# Patient Record
Sex: MALE | Race: WHITE | NOT HISPANIC OR LATINO | Employment: FULL TIME | URBAN - METROPOLITAN AREA
[De-identification: names, ages, dates, MRNs, and addresses within clinical notes are randomized per-mention and may not be internally consistent; named-entity substitution may affect disease eponyms.]

---

## 2017-11-13 ENCOUNTER — OFFICE VISIT (OUTPATIENT)
Dept: FAMILY MEDICINE | Facility: CLINIC | Age: 32
End: 2017-11-13
Payer: COMMERCIAL

## 2017-11-13 VITALS
BODY MASS INDEX: 29.7 KG/M2 | TEMPERATURE: 99 F | OXYGEN SATURATION: 98 % | SYSTOLIC BLOOD PRESSURE: 116 MMHG | HEIGHT: 65 IN | HEART RATE: 98 BPM | WEIGHT: 178.25 LBS | DIASTOLIC BLOOD PRESSURE: 71 MMHG

## 2017-11-13 DIAGNOSIS — J32.4 PANSINUSITIS, UNSPECIFIED CHRONICITY: ICD-10-CM

## 2017-11-13 DIAGNOSIS — J02.9 PHARYNGITIS, UNSPECIFIED ETIOLOGY: Primary | ICD-10-CM

## 2017-11-13 LAB
CTP QC/QA: YES
S PYO RRNA THROAT QL PROBE: NEGATIVE

## 2017-11-13 PROCEDURE — 99204 OFFICE O/P NEW MOD 45 MIN: CPT | Mod: 25,S$GLB,, | Performed by: FAMILY MEDICINE

## 2017-11-13 PROCEDURE — 99999 PR PBB SHADOW E&M-NEW PATIENT-LVL III: CPT | Mod: PBBFAC,,, | Performed by: FAMILY MEDICINE

## 2017-11-13 PROCEDURE — 87880 STREP A ASSAY W/OPTIC: CPT | Mod: QW,S$GLB,, | Performed by: FAMILY MEDICINE

## 2017-11-13 RX ORDER — AMOXICILLIN AND CLAVULANATE POTASSIUM 875; 125 MG/1; MG/1
1 TABLET, FILM COATED ORAL 2 TIMES DAILY
Qty: 20 TABLET | Refills: 0 | Status: SHIPPED | OUTPATIENT
Start: 2017-11-13 | End: 2017-11-23

## 2017-11-13 RX ORDER — PROMETHAZINE HYDROCHLORIDE AND CODEINE PHOSPHATE 6.25; 1 MG/5ML; MG/5ML
5 SOLUTION ORAL EVERY 6 HOURS PRN
Qty: 118 ML | Refills: 0 | Status: SHIPPED | OUTPATIENT
Start: 2017-11-13 | End: 2017-11-23

## 2017-11-13 NOTE — PATIENT INSTRUCTIONS
Sinusitis (Antibiotic Treatment)    The sinuses are air-filled spaces within the bones of the face. They connect to the inside of the nose. Sinusitis is an inflammation of the tissue lining the sinus cavity. Sinus inflammation can occur during a cold. It can also be due to allergies to pollens and other particles in the air. Sinusitis can cause symptoms of sinus congestion and fullness. A sinus infection causes fever, headache and facial pain. There is often green or yellow drainage from the nose or into the back of the throat (post-nasal drip). You have been given antibiotics to treat this condition.  Home care:  · Take the full course of antibiotics as instructed. Do not stop taking them, even if you feel better.  · Drink plenty of water, hot tea, and other liquids. This may help thin mucus. It also may promote sinus drainage.  · Heat may help soothe painful areas of the face. Use a towel soaked in hot water. Or,  the shower and direct the hot spray onto your face. Using a vaporizer along with a menthol rub at night may also help.   · An expectorant containing guaifenesin may help thin the mucus and promote drainage from the sinuses.  · Over-the-counter decongestants may be used unless a similar medicine was prescribed. Nasal sprays work the fastest. Use one that contains phenylephrine or oxymetazoline. First blow the nose gently. Then use the spray. Do not use these medicines more often than directed on the label or symptoms may get worse. You may also use tablets containing pseudoephedrine. Avoid products that combine ingredients, because side effects may be increased. Read labels. You can also ask the pharmacist for help. (NOTE: Persons with high blood pressure should not use decongestants. They can raise blood pressure.)  · Over-the-counter antihistamines may help if allergies contributed to your sinusitis.    · Do not use nasal rinses or irrigation during an acute sinus infection, unless told to by  your health care provider. Rinsing may spread the infection to other sinuses.  · Use acetaminophen or ibuprofen to control pain, unless another pain medicine was prescribed. (If you have chronic liver or kidney disease or ever had a stomach ulcer, talk with your doctor before using these medicines. Aspirin should never be used in anyone under 18 years of age who is ill with a fever. It may cause severe liver damage.)  · Don't smoke. This can worsen symptoms.  Follow-up care  Follow up with your healthcare provider or our staff if you are not improving within the next week.  When to seek medical advice  Call your healthcare provider if any of these occur:  · Facial pain or headache becoming more severe  · Stiff neck  · Unusual drowsiness or confusion  · Swelling of the forehead or eyelids  · Vision problems, including blurred or double vision  · Fever of 100.4ºF (38ºC) or higher, or as directed by your healthcare provider  · Seizure  · Breathing problems  · Symptoms not resolving within 10 days  Date Last Reviewed: 4/13/2015  © 2433-9456 [employer]. [address] [place] [address]. All rights reserved. This information is not intended as a substitute for professional medical care. Always follow your healthcare professional's instructions.        Self-Care for Sore Throats    Sore throats happen for many reasons, such as colds, allergies, and infections caused by viruses or bacteria. In any case, your throat becomes red and sore. Your goal for self-care is to reduce your discomfort while giving your throat a chance to heal.  Moisten and soothe your throat  Tips include the following:  · Try a sip of water first thing after waking up.  · Keep your throat moist by drinking 6 or more glasses of clear liquids every day.  · Run a cool-air humidifier in your room overnight.  · Avoid cigarette smoke.   · Suck on throat lozenges, cough drops, hard candy, ice chips, or frozen fruit-juice bars. Use the  sugar-free versions if your diet or medical condition requires them.  Gargle to ease irritation  Gargling every hour or 2 can ease irritation. Try gargling with 1 of these solutions:  · 1/4 teaspoon of salt in 1/2 cup of warm water  · An over-the-counter anesthetic gargle  Use medicine for more relief  Over-the-counter medicine can reduce sore throat symptoms. Ask your pharmacist if you have questions about which medicine to use:  · Ease pain with anesthetic sprays. Aspirin or an aspirin substitute also helps. Remember, never give aspirin to anyone 18 or younger, or if you are already taking blood thinners.   · For sore throats caused by allergies, try antihistamines to block the allergic reaction.  · Remember: unless a sore throat is caused by a bacterial infection, antibiotics wont help you.  Prevent future sore throats  Prevention tips include the following:  · Stop smoking or reduce contact with secondhand smoke. Smoke irritates the tender throat lining.  · Limit contact with pets and with allergy-causing substances, such as pollen and mold.  · When youre around someone with a sore throat or cold, wash your hands often to keep viruses or bacteria from spreading.  · Dont strain your vocal cords.  Call your healthcare provider  Contact your healthcare provider if you have:  · A temperature over 101°F (38.3°C)  · White spots on the throat  · Great difficulty swallowing  · Trouble breathing  · A skin rash  · Recent exposure to someone else with strep bacteria  · Severe hoarseness and swollen glands in the neck or jaw   Date Last Reviewed: 8/1/2016  © 6078-0115 [employer]. [address] [place] [address], [place] [address]. All rights reserved. This information is not intended as a substitute for professional medical care. Always follow your healthcare professional's instructions.

## 2017-11-13 NOTE — PROGRESS NOTES
Subjective:       Patient ID: Cirilo Pichardo is a 31 y.o. male.    Chief Complaint: Cough and Sore Throat    Cough   Associated symptoms include headaches, postnasal drip, rhinorrhea and a sore throat. Pertinent negatives include no chest pain, chills, ear pain, fever, rash, shortness of breath or wheezing.   Sore Throat    This is a new problem. The current episode started more than 1 month ago. Associated symptoms include congestion, coughing, diarrhea and headaches. Pertinent negatives include no abdominal pain, drooling, ear discharge, ear pain, plugged ear sensation, neck pain, shortness of breath or swollen glands.   URI    This is a recurrent problem. The current episode started in the past 7 days (has been sick 3 times in the past 2 months. Was treated with steroid both times, got better and then worse. Has been taking keflex at home for the infection for 3 days without any improvement). The problem has been gradually worsening. There has been no fever. Associated symptoms include congestion, coughing, diarrhea, headaches, rhinorrhea, sinus pain, sneezing and a sore throat. Pertinent negatives include no abdominal pain, chest pain, dysuria, ear pain, neck pain, plugged ear sensation, rash, swollen glands or wheezing. Associated symptoms comments: 3 watery stool. Back and shoulder pain. Treatments tried: cold medicine. The treatment provided no relief.   Sinusitis   This is a recurrent problem. The current episode started more than 1 month ago. Associated symptoms include congestion, coughing, headaches, sinus pressure, sneezing and a sore throat. Pertinent negatives include no chills, diaphoresis, ear pain, neck pain, shortness of breath or swollen glands.     Review of Systems   Constitutional: Positive for activity change. Negative for appetite change, chills, diaphoresis, fatigue, fever and unexpected weight change.   HENT: Positive for congestion, postnasal drip, rhinorrhea, sinus pain, sinus pressure,  "sneezing, sore throat and voice change. Negative for drooling, ear discharge, ear pain, facial swelling and tinnitus.    Eyes: Negative for discharge and itching.   Respiratory: Positive for cough. Negative for chest tightness, shortness of breath and wheezing.    Cardiovascular: Negative for chest pain, palpitations and leg swelling.   Gastrointestinal: Positive for diarrhea. Negative for abdominal pain.   Genitourinary: Negative for dysuria.   Musculoskeletal: Positive for back pain. Negative for neck pain.   Skin: Negative for rash.   Neurological: Positive for headaches.   Psychiatric/Behavioral: Positive for agitation.       Objective:      /71 (BP Location: Left arm, Patient Position: Sitting, BP Method: Medium (Manual))   Pulse 98   Temp 98.6 °F (37 °C) (Oral)   Ht 5' 5" (1.651 m)   Wt 80.8 kg (178 lb 3.9 oz)   SpO2 98%   BMI 29.66 kg/m²     Physical Exam   Constitutional: He is oriented to person, place, and time. He appears well-developed and well-nourished. No distress.   Sick appearing, sniffing a lot and face flushed   HENT:   Head: Normocephalic and atraumatic.   Right Ear: External ear normal.   Left Ear: External ear normal.   Mouth/Throat: Oropharyngeal exudate and posterior oropharyngeal erythema present.       Eyes: Conjunctivae and EOM are normal. No scleral icterus.   Neck: Normal range of motion. Neck supple. No thyromegaly present.   Cardiovascular: Normal rate, regular rhythm, normal heart sounds and intact distal pulses.    No murmur heard.  Pulmonary/Chest: Effort normal and breath sounds normal. No respiratory distress. He has no wheezes. He has no rales.   Abdominal: Soft. Bowel sounds are normal. There is no tenderness. There is no guarding.   Musculoskeletal: Normal range of motion. He exhibits no edema.   Lymphadenopathy:        Head (right side): No submandibular adenopathy present.        Head (left side): No submandibular adenopathy present.   Neurological: He is alert " and oriented to person, place, and time. No cranial nerve deficit.   Skin: Skin is warm and dry.   Psychiatric: He has a normal mood and affect. His behavior is normal.   Nursing note and vitals reviewed.      Assessment:       1. Pharyngitis, unspecified etiology    2. Pansinusitis, unspecified chronicity        Plan:   Pharyngitis, unspecified etiology  -     POCT Rapid Strep A which came out negative    Pansinusitis, unspecified chronicity  -bacterial infection  -failed conservative therapy  -stop keflex  -     promethazine-codeine 6.25-10 mg/5 ml (PHENERGAN WITH CODEINE) 6.25-10 mg/5 mL syrup; Take 5 mLs by mouth every 6 (six) hours as needed for Cough.  Dispense: 118 mL; Refill: 0  -     amoxicillin-clavulanate 875-125mg (AUGMENTIN) 875-125 mg per tablet; Take 1 tablet by mouth 2 (two) times daily.  Dispense: 20 tablet; Refill: 0  -Loratadine 10mg daily for rhinorrhea  -salt warm water gargle for sore throat

## 2018-01-04 ENCOUNTER — OFFICE VISIT (OUTPATIENT)
Dept: FAMILY MEDICINE | Facility: CLINIC | Age: 33
End: 2018-01-04
Payer: COMMERCIAL

## 2018-01-04 VITALS
TEMPERATURE: 99 F | HEIGHT: 65 IN | BODY MASS INDEX: 30.85 KG/M2 | OXYGEN SATURATION: 97 % | DIASTOLIC BLOOD PRESSURE: 81 MMHG | WEIGHT: 185.19 LBS | HEART RATE: 108 BPM | SYSTOLIC BLOOD PRESSURE: 135 MMHG

## 2018-01-04 DIAGNOSIS — J06.9 VIRAL URI WITH COUGH: Primary | ICD-10-CM

## 2018-01-04 PROCEDURE — 99213 OFFICE O/P EST LOW 20 MIN: CPT | Mod: S$GLB,,,

## 2018-01-04 PROCEDURE — 99999 PR PBB SHADOW E&M-EST. PATIENT-LVL III: CPT | Mod: PBBFAC,,,

## 2018-01-04 RX ORDER — BENZONATATE 200 MG/1
200 CAPSULE ORAL 3 TIMES DAILY PRN
Qty: 30 CAPSULE | Refills: 0 | Status: SHIPPED | OUTPATIENT
Start: 2018-01-04 | End: 2018-01-14

## 2018-01-04 RX ORDER — METHYLPREDNISOLONE 4 MG/1
TABLET ORAL
Qty: 1 PACKAGE | Refills: 0 | Status: SHIPPED | OUTPATIENT
Start: 2018-01-04 | End: 2018-01-10

## 2018-01-04 NOTE — PROGRESS NOTES
Subjective:       Patient ID: Cirilo Pichardo is a 32 y.o. male.    Chief Complaint: Cough; Sore Throat; and Sinusitis    HPI   The patient presents today with a 2 day complaint of nasal congestion, rhinorrhea, and a sore throat.  He voices some associated diffuse body aches, cough, and headaches.  He says his symptoms are constant and moderate in intensity.  He denies any fever or wheezing.  He cannot identify any exacerbating or mitigating factors.     Review of Systems   Constitutional: Negative for activity change, appetite change, fatigue, fever and unexpected weight change.   HENT: Positive for congestion, rhinorrhea and sore throat.    Eyes: Negative.    Respiratory: Positive for cough. Negative for chest tightness, shortness of breath and wheezing.    Cardiovascular: Negative for chest pain, palpitations and leg swelling.   Gastrointestinal: Negative for constipation, diarrhea, nausea and vomiting.   Endocrine: Negative.    Genitourinary: Negative.    Musculoskeletal: Negative.         Diffuse body aches   Skin: Negative for color change.   Allergic/Immunologic: Negative.    Neurological: Positive for headaches. Negative for dizziness, weakness and light-headedness.   Hematological: Negative.    Psychiatric/Behavioral: Negative for sleep disturbance.         Objective:      Physical Exam   Constitutional: He is oriented to person, place, and time. He appears well-developed and well-nourished. No distress.   HENT:   Head: Normocephalic and atraumatic. Hair is normal.   Right Ear: Hearing, tympanic membrane, external ear and ear canal normal.   Left Ear: Hearing, tympanic membrane, external ear and ear canal normal.   Nose: Mucosal edema and rhinorrhea present. No nose lacerations, sinus tenderness, nasal deformity, septal deviation or nasal septal hematoma. No epistaxis.  No foreign bodies. Right sinus exhibits no maxillary sinus tenderness and no frontal sinus tenderness. Left sinus exhibits no maxillary sinus  tenderness and no frontal sinus tenderness.   Mouth/Throat: Uvula is midline, oropharynx is clear and moist and mucous membranes are normal.   Eyes: Conjunctivae are normal. Pupils are equal, round, and reactive to light. Right eye exhibits no discharge. Left eye exhibits no discharge.   Neck: Trachea normal, normal range of motion and phonation normal. Neck supple. No tracheal deviation present.   Cardiovascular: Normal rate, regular rhythm, normal heart sounds and intact distal pulses.  Exam reveals no gallop and no friction rub.    No murmur heard.  Pulmonary/Chest: Effort normal and breath sounds normal. No respiratory distress. He has no decreased breath sounds. He has no wheezes. He has no rhonchi. He has no rales. He exhibits no tenderness.   Musculoskeletal: Normal range of motion.   Lymphadenopathy:        Head (right side): No submental, no submandibular, no tonsillar, no preauricular, no posterior auricular and no occipital adenopathy present.        Head (left side): No submental, no submandibular, no tonsillar, no preauricular, no posterior auricular and no occipital adenopathy present.     He has no cervical adenopathy.        Right cervical: No superficial cervical, no deep cervical and no posterior cervical adenopathy present.       Left cervical: No superficial cervical, no deep cervical and no posterior cervical adenopathy present.   Neurological: He is alert and oriented to person, place, and time. He exhibits normal muscle tone. GCS eye subscore is 4. GCS verbal subscore is 5. GCS motor subscore is 6.   Skin: Skin is warm and dry. No rash noted. He is not diaphoretic. No erythema. No pallor.   Psychiatric: He has a normal mood and affect. His speech is normal and behavior is normal. Judgment and thought content normal.       Assessment:       1. Viral URI with cough          Plan:   Viral URI with cough  -     methylPREDNISolone (MEDROL DOSEPACK) 4 mg tablet; use as directed  Dispense: 1 Package;  Refill: 0  -     benzonatate (TESSALON) 200 MG capsule; Take 1 capsule (200 mg total) by mouth 3 (three) times daily as needed.  Dispense: 30 capsule; Refill: 0            Disclaimer: This note is prepared using voice recognition software.

## 2018-01-04 NOTE — LETTER
January 4, 2018      [facility]  [address]  [facility] [address]  Phone: [phone]  Fax: [phone]       Patient: Cirilo Pichardo   YOB: 1985  Date of Visit: 01/04/2018    To Whom It May Concern:    Quirino Pichardo  was at [facility] on 01/04/2018. He may return to work/school on 01/05/2018 with no restrictions. If you have any questions or concerns, or if I can be of further assistance, please do not hesitate to contact me.    Sincerely,    CARRIE Moya

## 2018-01-04 NOTE — LETTER
January 4, 2018      [facility]  [address]  [facility] [address]  Phone: [phone]  Fax: [phone]       Patient: Cirilo Pichardo   YOB: 1985  Date of Visit: 01/04/2018    To Whom It May Concern:    Quirino Pichardo  was at [facility] on 01/04/2018.   . If you have any questions or concerns, or if I can be of further assistance, please do not hesitate to contact me.    Sincerely,      Brian Casey LPN

## 2018-02-09 ENCOUNTER — OFFICE VISIT (OUTPATIENT)
Dept: FAMILY MEDICINE | Facility: CLINIC | Age: 33
End: 2018-02-09
Payer: COMMERCIAL

## 2018-02-09 VITALS
WEIGHT: 180.44 LBS | BODY MASS INDEX: 30.06 KG/M2 | DIASTOLIC BLOOD PRESSURE: 83 MMHG | TEMPERATURE: 97 F | HEIGHT: 65 IN | SYSTOLIC BLOOD PRESSURE: 135 MMHG | OXYGEN SATURATION: 97 % | HEART RATE: 98 BPM

## 2018-02-09 DIAGNOSIS — J02.9 PHARYNGITIS, UNSPECIFIED ETIOLOGY: Primary | ICD-10-CM

## 2018-02-09 DIAGNOSIS — G43.709 CHRONIC MIGRAINE WITHOUT AURA WITHOUT STATUS MIGRAINOSUS, NOT INTRACTABLE: ICD-10-CM

## 2018-02-09 PROCEDURE — 96372 THER/PROPH/DIAG INJ SC/IM: CPT | Mod: S$GLB,,, | Performed by: FAMILY MEDICINE

## 2018-02-09 PROCEDURE — 99213 OFFICE O/P EST LOW 20 MIN: CPT | Mod: 25,SA,S$GLB,

## 2018-02-09 PROCEDURE — 99213 OFFICE O/P EST LOW 20 MIN: CPT | Mod: SA,PO

## 2018-02-09 PROCEDURE — 3008F BODY MASS INDEX DOCD: CPT | Mod: S$GLB,,,

## 2018-02-09 PROCEDURE — 99999 PR PBB SHADOW E&M-EST. PATIENT-LVL III: CPT | Mod: PBBFAC,,,

## 2018-02-09 RX ORDER — SUMATRIPTAN 50 MG/1
50 TABLET, FILM COATED ORAL DAILY PRN
Qty: 6 TABLET | Refills: 0 | Status: SHIPPED | OUTPATIENT
Start: 2018-02-09 | End: 2022-09-01 | Stop reason: SDUPTHER

## 2018-02-09 RX ORDER — METHYLPREDNISOLONE ACETATE 80 MG/ML
80 INJECTION, SUSPENSION INTRA-ARTICULAR; INTRALESIONAL; INTRAMUSCULAR; SOFT TISSUE
Status: COMPLETED | OUTPATIENT
Start: 2018-02-09 | End: 2018-02-09

## 2018-02-09 RX ADMIN — METHYLPREDNISOLONE ACETATE 80 MG: 80 INJECTION, SUSPENSION INTRA-ARTICULAR; INTRALESIONAL; INTRAMUSCULAR; SOFT TISSUE at 08:02

## 2018-02-09 NOTE — PROGRESS NOTES
Subjective:       Patient ID: Cirilo Pichardo is a 32 y.o. male.    Chief Complaint: Sore Throat; Sinus Problem; and Cough    HPI   The patient presents today with a 1 day(s) complaint of nasal congestion, PND, rhinorrhea. he says His symptoms are constant and moderate in intensity.  he voices associated cough. he denies fever, SOB, or wheezing. he  can not identify any exacerbating or mitigating factors.    He also voices a chronic complaint of migraine headaches.  He has been seeing a neurologist who has him on Elavil for migraine prophylaxis.  His expected taking 50 mg by mouth nightly.  He says he cannot do this because the medication makes him too somnolent the next day so he has not been taking the medication at all.  He says his headaches are intermittent and moderate to severe in intensity.  He denies having headache at this time.  He does not have anything to take for migraine treatment.  He denies any history of cardiovascular disease.  We discussed Imitrex along with its side effects and frequency of use.  He would like to try.  Also advised him to cut the amitriptyline back to 10 mg and see if that will help assuage some of his headaches, if not slowly increased to find the right dose that will decreased frequency of headaches and allow him to function the next day.    Review of Systems   Constitutional: Negative for activity change, appetite change, fatigue, fever and unexpected weight change.   HENT: Positive for congestion, rhinorrhea and sore throat.    Eyes: Negative for discharge, redness and visual disturbance.   Respiratory: Positive for cough. Negative for chest tightness, shortness of breath and wheezing.    Cardiovascular: Negative for chest pain, palpitations and leg swelling.   Gastrointestinal: Negative for abdominal pain, constipation, diarrhea, nausea and vomiting.   Endocrine: Negative.    Genitourinary: Negative.    Musculoskeletal: Negative.    Skin: Negative for color change and rash.    Allergic/Immunologic: Negative.    Neurological: Positive for headaches. Negative for dizziness, weakness and light-headedness.   Hematological: Negative.    Psychiatric/Behavioral: Negative for dysphoric mood and sleep disturbance.         Objective:      Physical Exam   Constitutional: He is oriented to person, place, and time. He appears well-developed and well-nourished. No distress.   HENT:   Head: Normocephalic and atraumatic. Hair is normal.   Right Ear: Hearing, tympanic membrane, external ear and ear canal normal.   Left Ear: Hearing, tympanic membrane, external ear and ear canal normal.   Nose: No mucosal edema, rhinorrhea, nose lacerations, sinus tenderness, nasal deformity, septal deviation or nasal septal hematoma. No epistaxis.  No foreign bodies. Right sinus exhibits no maxillary sinus tenderness and no frontal sinus tenderness. Left sinus exhibits no maxillary sinus tenderness and no frontal sinus tenderness.   Mouth/Throat: Uvula is midline, oropharynx is clear and moist and mucous membranes are normal. Tonsils are 0 on the right. Tonsils are 0 on the left. No tonsillar exudate.   Eyes: Conjunctivae are normal. Pupils are equal, round, and reactive to light. Right eye exhibits no discharge. Left eye exhibits no discharge.   Neck: Trachea normal, normal range of motion and phonation normal. Neck supple. No tracheal deviation present.   Cardiovascular: Normal rate, regular rhythm, normal heart sounds and intact distal pulses.  Exam reveals no gallop and no friction rub.    No murmur heard.  Pulmonary/Chest: Effort normal and breath sounds normal. No respiratory distress. He has no decreased breath sounds. He has no wheezes. He has no rhonchi. He has no rales. He exhibits no tenderness.   Musculoskeletal: Normal range of motion.   Lymphadenopathy:        Head (right side): No submental, no submandibular, no tonsillar, no preauricular, no posterior auricular and no occipital adenopathy present.         Head (left side): No submental, no submandibular, no tonsillar, no preauricular, no posterior auricular and no occipital adenopathy present.     He has no cervical adenopathy.        Right cervical: No superficial cervical, no deep cervical and no posterior cervical adenopathy present.       Left cervical: No superficial cervical, no deep cervical and no posterior cervical adenopathy present.   Neurological: He is alert and oriented to person, place, and time. He exhibits normal muscle tone. GCS eye subscore is 4. GCS verbal subscore is 5. GCS motor subscore is 6.   Skin: Skin is warm and dry. No rash noted. He is not diaphoretic. No erythema. No pallor.   Psychiatric: He has a normal mood and affect. His speech is normal and behavior is normal. Judgment and thought content normal.       Assessment:       1. Pharyngitis, unspecified etiology    2. Chronic migraine without aura without status migrainosus, not intractable          Plan:   Pharyngitis, unspecified etiology  -     methylPREDNISolone acetate injection 80 mg; Inject 1 mL (80 mg total) into the muscle one time.    Chronic migraine without aura without status migrainosus, not intractable  -     sumatriptan (IMITREX) 50 MG tablet; Take 1 tablet (50 mg total) by mouth daily as needed for Migraine.  Dispense: 6 tablet; Refill: 0            Disclaimer: This note is prepared using voice recognition software.

## 2018-02-09 NOTE — PROGRESS NOTES
Pt given methylprednisolone acetate injection 80 mg/ ml IM left ventroglutael. Pt advised to wait 15 minutes to observe for adverse reaction. Pt tolerated well.

## 2018-02-09 NOTE — LETTER
February 9, 2018      [facility]  [address]  [facility] [address]  Phone: [phone]  Fax: [phone]       Patient: Cirilo Pichardo   YOB: 1985  Date of Visit: 02/09/2018    To Whom It May Concern:    Quirino Pichardo  was at [facility] on 02/09/2018. He may return to work/school on 02/12/2018 with no restrictions. If you have any questions or concerns, or if I can be of further assistance, please do not hesitate to contact me.    Sincerely,    CARRIE Moya

## 2018-06-27 ENCOUNTER — OFFICE VISIT (OUTPATIENT)
Dept: ORTHOPEDICS | Facility: CLINIC | Age: 33
End: 2018-06-27
Payer: COMMERCIAL

## 2018-06-27 ENCOUNTER — HOSPITAL ENCOUNTER (OUTPATIENT)
Dept: RADIOLOGY | Facility: HOSPITAL | Age: 33
Discharge: HOME | End: 2018-06-27
Attending: ORTHOPAEDIC SURGERY
Payer: COMMERCIAL

## 2018-06-27 DIAGNOSIS — R52 PAIN: ICD-10-CM

## 2018-06-27 DIAGNOSIS — R52 PAIN: Primary | ICD-10-CM

## 2018-06-27 DIAGNOSIS — M76.62 ACHILLES TENDINITIS OF LEFT LOWER EXTREMITY: ICD-10-CM

## 2018-06-27 DIAGNOSIS — M70.50 PES ANSERINE BURSITIS: ICD-10-CM

## 2018-06-27 DIAGNOSIS — M62.89 HAMSTRING TIGHTNESS: Primary | ICD-10-CM

## 2018-06-27 DIAGNOSIS — S86.012D RUPTURE OF LEFT ACHILLES TENDON, SUBSEQUENT ENCOUNTER: Primary | ICD-10-CM

## 2018-06-27 PROCEDURE — 73590 X-RAY EXAM OF LOWER LEG: CPT | Mod: TC,FY,PO,LT

## 2018-06-27 PROCEDURE — 99204 OFFICE O/P NEW MOD 45 MIN: CPT | Mod: S$GLB,,, | Performed by: ORTHOPAEDIC SURGERY

## 2018-06-27 PROCEDURE — 99999 PR PBB SHADOW E&M-EST. PATIENT-LVL I: CPT | Mod: PBBFAC,,, | Performed by: ORTHOPAEDIC SURGERY

## 2018-06-27 PROCEDURE — 73590 X-RAY EXAM OF LOWER LEG: CPT | Mod: 26,LT,, | Performed by: RADIOLOGY

## 2018-06-27 RX ORDER — MELOXICAM 15 MG/1
15 TABLET ORAL DAILY
Qty: 30 TABLET | Refills: 2 | Status: SHIPPED | OUTPATIENT
Start: 2018-06-27 | End: 2019-12-10 | Stop reason: ALTCHOICE

## 2018-06-27 NOTE — PROGRESS NOTES
Subjective:     Patient ID: Cirilo Pichardo is a 32 y.o. male.    Chief Complaint: No chief complaint on file.    Cirilo Pichardo is a 32 y.o. male here for left leg pain and left knee pain. Had a fall a few years ago and had some chronic intermittent left leg pain.      Leg Pain    The pain is present in the left ankle and left knee. The pain radiates to the left foot and left thigh. This is a chronic problem. The current episode started more than 1 year ago. The problem occurs intermittently. The quality of the pain is described as shooting and aching. The pain is at a severity of 6/10. Associated symptoms include a limited range of motion. Pertinent negatives include no fever or numbness. The symptoms are aggravated by activity and walking. He has tried cold and OTC pain meds (epsom salt, aleve, advil) for the symptoms. The treatment provided no relief. Physical therapy was not tried.      History reviewed. No pertinent past medical history.  History reviewed. No pertinent surgical history.  History reviewed. No pertinent family history.  Social History     Social History    Marital status:      Spouse name: N/A    Number of children: N/A    Years of education: N/A     Occupational History    Not on file.     Social History Main Topics    Smoking status: Never Smoker    Smokeless tobacco: Never Used    Alcohol use No    Drug use: No    Sexual activity: Yes     Partners: Female     Other Topics Concern    Not on file     Social History Narrative    No narrative on file     Medication List with Changes/Refills   New Medications    MELOXICAM (MOBIC) 15 MG TABLET    Take 1 tablet (15 mg total) by mouth once daily.   Current Medications    SUMATRIPTAN (IMITREX) 50 MG TABLET    Take 1 tablet (50 mg total) by mouth daily as needed for Migraine.     Review of patient's allergies indicates:  No Known Allergies  Review of Systems   Constitution: Negative for fever and night sweats.   HENT: Negative for hearing  loss.    Eyes: Negative for blurred vision and visual disturbance.   Cardiovascular: Negative for chest pain and leg swelling.   Respiratory: Negative for shortness of breath.    Endocrine: Negative for polyuria.   Hematologic/Lymphatic: Negative for bleeding problem.   Skin: Negative for rash.   Musculoskeletal: Positive for joint pain and joint swelling. Negative for back pain, muscle cramps and muscle weakness.   Gastrointestinal: Negative for melena.   Genitourinary: Negative for hematuria.   Neurological: Negative for loss of balance, numbness and paresthesias.   Psychiatric/Behavioral: Negative for altered mental status.       Objective:   There is no height or weight on file to calculate BMI.  There were no vitals filed for this visit.    General: Cirilo is well-developed, well-nourished, appears stated age, in no acute distress, alert and oriented to time, place and person.       General    Vitals reviewed.  Constitutional: He is oriented to person, place, and time. He appears well-developed and well-nourished. No distress.   HENT:   Mouth/Throat: No oropharyngeal exudate.   Eyes: Right eye exhibits no discharge. Left eye exhibits no discharge.   Neck: Normal range of motion.   Cardiovascular: Normal rate.    Pulmonary/Chest: Effort normal and breath sounds normal. No respiratory distress.   Neurological: He is alert and oriented to person, place, and time. He has normal reflexes. No cranial nerve deficit. Coordination normal.   Psychiatric: He has a normal mood and affect. His behavior is normal. Judgment and thought content normal.     General Musculoskeletal Exam   Gait: normal     Right Ankle/Foot Exam     Inspection   Scars: absent  Deformity: absent  Erythema: absent  Bruising: Ankle - absent Foot - absent  Effusion: Ankle - absent Foot - absent  Atrophy: Ankle - absent Foot - absent    Range of Motion   Ankle Joint   Dorsiflexion:  10 normal   Plantar flexion:  35 normal   Subtalar Joint   Inversion:   15 normal   Eversion:  5 normal   Ford Test:  negative    Alignment   Knee Alignment: neutral  Hindfoot Alignment: neutral  Midfoot Alignment: normal  Forefoot Alignment: normal    Tests   Anterior drawer: 1+  Varus tilt: 1+  Heel Walk: able to perform  Tiptoe Walk: able to perform  Single Heel Rise: able to perform  External Rotation Test: negative  Squeeze Test: negative    Other   Ankle Crepitus: absent  Foot Crepitus:  absent  Sensation: normal  Peroneal Subluxation: negative    Left Ankle/Foot Exam     Inspection  Deformity: absent  Erythema: absent  Bruising: Ankle - absent Foot - absent  Effusion: Ankle - absent Foot - absent  Atrophy: Ankle - absent Foot - absent  Scars: absent    Tenderness   The patient is tender to palpation of the Achilles tendon.    Range of Motion   Ankle Joint  Dorsiflexion:  10 normal   Plantar flexion:  35 normal     Subtalar Joint   Inversion:  15 normal   Eversion:  5 normal   Ford Test:  normal    Alignment   Knee Alignment: neutral  Hindfoot Alignment: neutral  Midfoot Alignment: normal  Forefoot Alignment: normal    Tests   Anterior drawer: 1+  Varus tilt: 1+  Heel Walk: able to perform  Tiptoe Walk: able to perform  Single Heel Rise: able to perform  External Rotation Test: negative  Squeeze Test: absent    Other   Foot Crepitus:  absent  Ankle Crepitus: absent  Sensation: normal  Peroneal Subluxation: negative    Right Knee Exam     Inspection   Erythema: absent  Scars: absent  Swelling: absent  Effusion: effusion  Deformity: deformity  Bruising: absent    Tenderness   The patient is experiencing no tenderness.         Range of Motion   Extension: 0   Flexion: 140     Tests   Meniscus   David:  Medial - negative Lateral - negative  Ligament Examination Lachman: normal (-1 to 2mm) PCL-Posterior Drawer: normal (0 to 2mm)     MCL - Valgus: normal (0 to 2mm)  LCL - Varus: normal  Patella   Patellar Apprehension: negative  Passive Patellar Tilt: neutral  Patellar  Tracking: normal  Patellar Glide (quadrants): Lateral - 1   Medial - 2  Q-Angle at 90 degrees: normal  Patellar Grind: negative    Other   Meniscal Cyst: absent  Popliteal (Baker's) Cyst: absent  Sensation: normal    Left Knee Exam     Inspection   Erythema: absent  Scars: absent  Swelling: absent  Effusion: absent  Deformity: deformity  Bruising: absent    Tenderness   The patient tender to palpation of the medial hamstring and pes anserinus.    Range of Motion   Extension: 0   Flexion: 140     Tests   Meniscus   David:  Medial - negative Lateral - negative  Stability Lachman: normal (-1 to 2mm)   MCL - Valgus: normal (0 to 2mm)  LCL - Varus: normal (0 to 2mm)  Patella   Patellar Apprehension: negative  Passive Patellar Tilt: neutral  Patellar Tracking: normal  Patellar Glide (Quadrants): Lateral - 1 Medial - 2  Q-Angle at 90 degrees: normal  Patellar Grind: negative    Other   Meniscal Cyst: absent  Popliteal (Baker's) Cyst: absent  Muscle Tightness: hamstring tightness  Sensation: normal    Right Hip Exam     Tests   Amirt: negative  Left Hip Exam     Tests   Amrit: negative          Muscle Strength   Right Lower Extremity   Hip Abduction:    Quadriceps:  5   Hamstrin/5   Anterior tibial:    Posterior tibial:    Gastrocsoleus:    Peroneal muscle:    EHL:    FDL:   EDL:   FHL:   Left Lower Extremity   Hip Abduction:    Quadriceps:     Hamstrin/5   Anterior tibial:     Posterior tibial:    Gastrocsoleus:    Peroneal muscle:    EHL:    FDL:   EDL:   FHL: /    Reflexes     Left Side  Quadriceps:  2+  Post Tibial:  2+  Achilles:  2+  Plantar Reflex: 2+    Right Side   Quadriceps:  2+  Post Tibial:  2+  Achilles:  2+  Plantar Reflex: 2+    Vascular Exam     Right Pulses  Dorsalis Pedis:      2+  Posterior Tibial:      2+        Left Pulses  Dorsalis Pedis:      2+  Posterior Tibial:      2+        Edema  Right Lower Leg: absent  Left  Lower Leg: absent    Xr tibia/fibula: Prior distal fibula fracture healed.     Assessment:     Encounter Diagnoses   Name Primary?    Hamstring tightness Yes    Pes anserine bursitis     Achilles tendinitis of left lower extremity         Plan:     We reviewed with Cirilo today, the pathology and natural history of his diagnosis. We had an extensive discussion as to the conservative treatment and management of their condition. We also discussed the variety of treatment options to include medication, physical therapy, diagnostic testing as well as other treatments.The decision was made to go forward with:    1. PT for achilles/hamstring    2. Mobic    3. F/up 3mo

## 2019-12-10 ENCOUNTER — HOSPITAL ENCOUNTER (EMERGENCY)
Facility: HOSPITAL | Age: 34
Discharge: HOME | End: 2019-12-10
Attending: EMERGENCY MEDICINE
Payer: MEDICAID

## 2019-12-10 VITALS
DIASTOLIC BLOOD PRESSURE: 101 MMHG | RESPIRATION RATE: 16 BRPM | OXYGEN SATURATION: 98 % | TEMPERATURE: 99 F | BODY MASS INDEX: 32.69 KG/M2 | SYSTOLIC BLOOD PRESSURE: 152 MMHG | HEIGHT: 65 IN | WEIGHT: 196.19 LBS | HEART RATE: 103 BPM

## 2019-12-10 DIAGNOSIS — F32.A DEPRESSION, UNSPECIFIED DEPRESSION TYPE: Primary | ICD-10-CM

## 2019-12-10 LAB
ALBUMIN SERPL BCP-MCNC: 4.2 G/DL (ref 3.5–5.2)
ALP SERPL-CCNC: 143 U/L (ref 55–135)
ALT SERPL W/O P-5'-P-CCNC: 35 U/L (ref 10–44)
AMPHET+METHAMPHET UR QL: NEGATIVE
ANION GAP SERPL CALC-SCNC: 12 MMOL/L (ref 8–16)
AST SERPL-CCNC: 22 U/L (ref 10–40)
BARBITURATES UR QL SCN>200 NG/ML: NEGATIVE
BASOPHILS # BLD AUTO: 0.03 K/UL (ref 0–0.2)
BASOPHILS NFR BLD: 0.3 % (ref 0–1.9)
BENZODIAZ UR QL SCN>200 NG/ML: NEGATIVE
BILIRUB SERPL-MCNC: 0.4 MG/DL (ref 0.1–1)
BILIRUB UR QL STRIP: NEGATIVE
BUN SERPL-MCNC: 10 MG/DL (ref 6–20)
BZE UR QL SCN: NEGATIVE
CALCIUM SERPL-MCNC: 9.5 MG/DL (ref 8.7–10.5)
CANNABINOIDS UR QL SCN: NEGATIVE
CHLORIDE SERPL-SCNC: 105 MMOL/L (ref 95–110)
CLARITY UR: CLEAR
CO2 SERPL-SCNC: 24 MMOL/L (ref 23–29)
COLOR UR: YELLOW
CREAT SERPL-MCNC: 0.9 MG/DL (ref 0.5–1.4)
CREAT UR-MCNC: 238.2 MG/DL (ref 23–375)
DIFFERENTIAL METHOD: ABNORMAL
EOSINOPHIL # BLD AUTO: 0 K/UL (ref 0–0.5)
EOSINOPHIL NFR BLD: 0.3 % (ref 0–8)
ERYTHROCYTE [DISTWIDTH] IN BLOOD BY AUTOMATED COUNT: 12.4 % (ref 11.5–14.5)
EST. GFR  (AFRICAN AMERICAN): >60 ML/MIN/1.73 M^2
EST. GFR  (NON AFRICAN AMERICAN): >60 ML/MIN/1.73 M^2
ETHANOL SERPL-MCNC: <10 MG/DL
GLUCOSE SERPL-MCNC: 90 MG/DL (ref 70–110)
GLUCOSE UR QL STRIP: NEGATIVE
HCT VFR BLD AUTO: 45.2 % (ref 40–54)
HGB BLD-MCNC: 15 G/DL (ref 14–18)
HGB UR QL STRIP: NEGATIVE
IMM GRANULOCYTES # BLD AUTO: 0.05 K/UL (ref 0–0.04)
IMM GRANULOCYTES NFR BLD AUTO: 0.5 % (ref 0–0.5)
KETONES UR QL STRIP: NEGATIVE
LEUKOCYTE ESTERASE UR QL STRIP: NEGATIVE
LYMPHOCYTES # BLD AUTO: 2 K/UL (ref 1–4.8)
LYMPHOCYTES NFR BLD: 20.7 % (ref 18–48)
MCH RBC QN AUTO: 27.9 PG (ref 27–31)
MCHC RBC AUTO-ENTMCNC: 33.2 G/DL (ref 32–36)
MCV RBC AUTO: 84 FL (ref 82–98)
METHADONE UR QL SCN>300 NG/ML: NEGATIVE
MONOCYTES # BLD AUTO: 0.7 K/UL (ref 0.3–1)
MONOCYTES NFR BLD: 7.1 % (ref 4–15)
NEUTROPHILS # BLD AUTO: 6.7 K/UL (ref 1.8–7.7)
NEUTROPHILS NFR BLD: 71.1 % (ref 38–73)
NITRITE UR QL STRIP: NEGATIVE
NRBC BLD-RTO: 0 /100 WBC
OPIATES UR QL SCN: NEGATIVE
PCP UR QL SCN>25 NG/ML: NEGATIVE
PH UR STRIP: 6 [PH] (ref 5–8)
PLATELET # BLD AUTO: 321 K/UL (ref 150–350)
PMV BLD AUTO: 10.3 FL (ref 9.2–12.9)
POTASSIUM SERPL-SCNC: 3.8 MMOL/L (ref 3.5–5.1)
PROT SERPL-MCNC: 7.6 G/DL (ref 6–8.4)
PROT UR QL STRIP: NEGATIVE
RBC # BLD AUTO: 5.38 M/UL (ref 4.6–6.2)
SODIUM SERPL-SCNC: 141 MMOL/L (ref 136–145)
SP GR UR STRIP: 1.02 (ref 1–1.03)
TOXICOLOGY INFORMATION: NORMAL
TSH SERPL DL<=0.005 MIU/L-ACNC: 0.58 UIU/ML (ref 0.4–4)
URN SPEC COLLECT METH UR: NORMAL
UROBILINOGEN UR STRIP-ACNC: NEGATIVE EU/DL
WBC # BLD AUTO: 9.48 K/UL (ref 3.9–12.7)

## 2019-12-10 PROCEDURE — 80307 DRUG TEST PRSMV CHEM ANLYZR: CPT

## 2019-12-10 PROCEDURE — 84443 ASSAY THYROID STIM HORMONE: CPT

## 2019-12-10 PROCEDURE — 85025 COMPLETE CBC W/AUTO DIFF WBC: CPT

## 2019-12-10 PROCEDURE — 99283 EMERGENCY DEPT VISIT LOW MDM: CPT

## 2019-12-10 PROCEDURE — 81003 URINALYSIS AUTO W/O SCOPE: CPT | Mod: 59

## 2019-12-10 PROCEDURE — 80320 DRUG SCREEN QUANTALCOHOLS: CPT

## 2019-12-10 PROCEDURE — 80053 COMPREHEN METABOLIC PANEL: CPT

## 2019-12-10 NOTE — ED PROVIDER NOTES
"SCRIBE #1 NOTE: I, Emily Mitesh, am scribing for, and in the presence of, Bernarda Man MD. I have scribed the entire note.       History     Chief Complaint   Patient presents with    Suicidal     Pt c/o of suicidal ideation with "mental breakdown" today. Bite reema from self on right wrist.      Review of patient's allergies indicates:  No Known Allergies      History of Present Illness     HPI    12/10/2019, 2:00 PM  History obtained from the patient      History of Present Illness: Cirilo Pichardo is a 34 y.o. male patient with a PMHx of depression who presents to the Emergency Department for a psychiatric evaluation. Pt reports he became very angry at his boss earlier today and had a mental breakdown. When he got home he was having SI and began biting himself. Pt denies any active SI at this time and states he would not do anything to harm himself because he still has his job. He notes previous hx of depression. Symptoms are constant and moderate in severity. No mitigating or exacerbating factors reported. No associated sxs included. Patient denies any fever, chills, SOB, CP, n/v, HI, auditory hallucinations, visual hallucinations, recent increased stress, sleep changes, alcohol use, IV drug use, and all other sxs at this time. No prior Tx reported. No further complaints or concerns at this time.       Arrival mode: Personal vehicle    PCP: Primary Doctor No        Past Medical History:  Past Medical History:   Diagnosis Date    Depression        Past Surgical History:  History reviewed. No pertinent surgical history.      Family History:  History reviewed. No pertinent history.     Social History:  Social History     Tobacco Use    Smoking status: Never Smoker    Smokeless tobacco: Never Used   Substance and Sexual Activity    Alcohol use: No    Drug use: No    Sexual activity: Yes     Partners: Female        Review of Systems     Review of Systems   Constitutional: Negative for chills and fever. "   HENT: Negative for sore throat.    Respiratory: Negative for cough and shortness of breath.    Cardiovascular: Negative for chest pain and leg swelling.   Gastrointestinal: Negative for diarrhea, nausea and vomiting.   Genitourinary: Negative for dysuria.   Musculoskeletal: Negative for back pain.   Skin: Positive for wound (bite reema to RUE).   Neurological: Negative for dizziness, weakness and numbness.   Psychiatric/Behavioral: Positive for self-injury (bite reema to RUE) and suicidal ideas. Negative for hallucinations (auditory/visual) and sleep disturbance.        (-) HI  (-) increased stress  (-) alcohol use  (-) IV drug use   All other systems reviewed and are negative.       Physical Exam     Initial Vitals [12/10/19 1326]   BP Pulse Resp Temp SpO2   (!) 154/86 (!) 122 20 99.3 °F (37.4 °C) 96 %      MAP       --          Physical Exam  Nursing Notes and Vital Signs Reviewed.  Constitutional: Patient is in no acute distress. Well-developed and well-nourished.  Head: Atraumatic. Normocephalic.  Eyes: PERRL. EOM intact. Conjunctivae are not pale. No scleral icterus.  ENT: Mucous membranes are moist. Oropharynx is clear and symmetric.    Neck: Supple. Full ROM. No lymphadenopathy.  Cardiovascular: Regular rate. Regular rhythm. No murmurs, rubs, or gallops. Distal pulses are 2+ and symmetric.  Pulmonary/Chest: No respiratory distress. Clear to auscultation bilaterally. No wheezing or rales.  Abdominal: Soft and non-distended.  There is no tenderness.  No rebound, guarding, or rigidity.   Musculoskeletal: Moves all extremities. No obvious deformities. No edema.  Skin: Warm and dry. Bite reema to R distal forearm.  Neurological:  Alert, awake, and appropriate.  Normal speech.  No acute focal neurological deficits are appreciated.  Psychiatric: Anxious. No active SI, HI, or AH/VH. Cooperative.     ED Course   Procedures  ED Vital Signs:  Vitals:    12/10/19 1326 12/10/19 1521   BP: (!) 154/86 (!) 152/101   Pulse:  "(!) 122 103   Resp: 20 16   Temp: 99.3 °F (37.4 °C)    TempSrc: Oral    SpO2: 96% 98%   Weight: 89 kg (196 lb 3.4 oz)    Height: 5' 5" (1.651 m)        Abnormal Lab Results:  Labs Reviewed   CBC W/ AUTO DIFFERENTIAL - Abnormal; Notable for the following components:       Result Value    Immature Grans (Abs) 0.05 (*)     All other components within normal limits   COMPREHENSIVE METABOLIC PANEL - Abnormal; Notable for the following components:    Alkaline Phosphatase 143 (*)     All other components within normal limits   TSH   URINALYSIS, REFLEX TO URINE CULTURE    Narrative:     Preferred Collection Type->Urine, Clean Catch   DRUG SCREEN PANEL, URINE EMERGENCY    Narrative:     Preferred Collection Type->Urine, Clean Catch   ALCOHOL,MEDICAL (ETHANOL)        All Lab Results:  Results for orders placed or performed during the hospital encounter of 12/10/19   CBC auto differential   Result Value Ref Range    WBC 9.48 3.90 - 12.70 K/uL    RBC 5.38 4.60 - 6.20 M/uL    Hemoglobin 15.0 14.0 - 18.0 g/dL    Hematocrit 45.2 40.0 - 54.0 %    Mean Corpuscular Volume 84 82 - 98 fL    Mean Corpuscular Hemoglobin 27.9 27.0 - 31.0 pg    Mean Corpuscular Hemoglobin Conc 33.2 32.0 - 36.0 g/dL    RDW 12.4 11.5 - 14.5 %    Platelets 321 150 - 350 K/uL    MPV 10.3 9.2 - 12.9 fL    Immature Granulocytes 0.5 0.0 - 0.5 %    Gran # (ANC) 6.7 1.8 - 7.7 K/uL    Immature Grans (Abs) 0.05 (H) 0.00 - 0.04 K/uL    Lymph # 2.0 1.0 - 4.8 K/uL    Mono # 0.7 0.3 - 1.0 K/uL    Eos # 0.0 0.0 - 0.5 K/uL    Baso # 0.03 0.00 - 0.20 K/uL    nRBC 0 0 /100 WBC    Gran% 71.1 38.0 - 73.0 %    Lymph% 20.7 18.0 - 48.0 %    Mono% 7.1 4.0 - 15.0 %    Eosinophil% 0.3 0.0 - 8.0 %    Basophil% 0.3 0.0 - 1.9 %    Differential Method Automated    Comprehensive metabolic panel   Result Value Ref Range    Sodium 141 136 - 145 mmol/L    Potassium 3.8 3.5 - 5.1 mmol/L    Chloride 105 95 - 110 mmol/L    CO2 24 23 - 29 mmol/L    Glucose 90 70 - 110 mg/dL    BUN, Bld 10 6 - " 20 mg/dL    Creatinine 0.9 0.5 - 1.4 mg/dL    Calcium 9.5 8.7 - 10.5 mg/dL    Total Protein 7.6 6.0 - 8.4 g/dL    Albumin 4.2 3.5 - 5.2 g/dL    Total Bilirubin 0.4 0.1 - 1.0 mg/dL    Alkaline Phosphatase 143 (H) 55 - 135 U/L    AST 22 10 - 40 U/L    ALT 35 10 - 44 U/L    Anion Gap 12 8 - 16 mmol/L    eGFR if African American >60 >60 mL/min/1.73 m^2    eGFR if non African American >60 >60 mL/min/1.73 m^2   TSH   Result Value Ref Range    TSH 0.583 0.400 - 4.000 uIU/mL   Urinalysis, Reflex to Urine Culture Urine, Clean Catch   Result Value Ref Range    Specimen UA Urine, Clean Catch     Color, UA Yellow Yellow, Straw, Kalee    Appearance, UA Clear Clear    pH, UA 6.0 5.0 - 8.0    Specific Gravity, UA 1.025 1.005 - 1.030    Protein, UA Negative Negative    Glucose, UA Negative Negative    Ketones, UA Negative Negative    Bilirubin (UA) Negative Negative    Occult Blood UA Negative Negative    Nitrite, UA Negative Negative    Urobilinogen, UA Negative <2.0 EU/dL    Leukocytes, UA Negative Negative   Drug screen panel, emergency   Result Value Ref Range    Benzodiazepines Negative     Methadone metabolites Negative     Cocaine (Metab.) Negative     Opiate Scrn, Ur Negative     Barbiturate Screen, Ur Negative     Amphetamine Screen, Ur Negative     THC Negative     Phencyclidine Negative     Creatinine, Random Ur 238.2 23.0 - 375.0 mg/dL    Toxicology Information SEE COMMENT    Ethanol   Result Value Ref Range    Alcohol, Medical, Serum <10 <10 mg/dL       Imaging Results:  Imaging Results    None                 The Emergency Provider reviewed the vital signs and test results, which are outlined above.     ED Discussion     4:11 PM: Re-evaluated pt. Pt is resting comfortably and is in no acute distress. Pt is not a danger to self and is having no SI. Wife is at bedside and is comfortable with pt going home. No harm contract was signed. Mental health resources provided.    4:12 PM: Discussed with pt all pertinent ED  information and results. Discussed pt dx and plan of tx. Gave pt all f/u and return to the ED instructions. All questions and concerns were addressed at this time. Pt expresses understanding of information and instructions, and is comfortable with plan to discharge. Pt is stable for discharge.    I discussed with patient and/or family/caretaker that evaluation in the ED does not suggest any emergent or life threatening medical conditions requiring immediate intervention beyond what was provided in the ED, and I believe patient is safe for discharge.  Regardless, an unremarkable evaluation in the ED does not preclude the development or presence of a serious of life threatening condition. As such, patient was instructed to return immediately for any worsening or change in current symptoms.       Medical Decision Making:   Clinical Tests:   Lab Tests: Ordered and Reviewed           ED Medication(s):  Medications - No data to display    New Prescriptions    No medications on file       Follow-up Information     [facility]. Schedule an appointment as soon as possible for a visit in 1 day.    Specialties:  Behavioral Health, Psychiatry, Psychology  Why:  Return to the Emergency Room, If symptoms worsen  Contact information:  [address] [facility] [address]  [phone]                       Scribe Attestation:   Scribe #1: I performed the above scribed service and the documentation accurately describes the services I performed. I attest to the accuracy of the note.     Attending:   Physician Attestation Statement for Scribe #1: I, Bernarda Man MD, personally performed the services described in this documentation, as scribed by Emily Sagastume, in my presence, and it is both accurate and complete.           Clinical Impression       ICD-10-CM ICD-9-CM   1. Depression, unspecified depression type F32.9 311       Disposition:   Disposition: Discharged  Condition: Stable       Bernarda  Landen Man MD  12/10/19 0057

## 2019-12-10 NOTE — ED NOTES
Tele psych consult placed, [facility] states that there is a back log of cases and that the consult may not occur until after 2000.

## 2022-05-31 ENCOUNTER — TELEPHONE (OUTPATIENT)
Dept: NEUROLOGY | Facility: CLINIC | Age: 37
End: 2022-05-31
Payer: MEDICAID

## 2022-05-31 NOTE — TELEPHONE ENCOUNTER
----- Message from Marisabel De La O sent at 5/31/2022  1:11 PM CDT -----  Who Called: Patient    What is the reqeust in detail: Requesting call back to schedule NP appointment to be seen for sharp back of the head pain, head aches, some hallucinations. Patient mentions that his left side of his head/brain seems to be most affected. Please advise.     Can the clinic reply by [facility]? No    Best Call Back Number: [phone]    Additional Information: Patient is being recommended to see a neurologist from his behavioral therapist/psychiatrist. Please advise.

## 2022-06-30 ENCOUNTER — OFFICE VISIT (OUTPATIENT)
Dept: NEUROLOGY | Facility: CLINIC | Age: 37
End: 2022-06-30
Payer: MEDICAID

## 2022-06-30 VITALS
HEIGHT: 65 IN | HEART RATE: 110 BPM | SYSTOLIC BLOOD PRESSURE: 132 MMHG | WEIGHT: 208 LBS | DIASTOLIC BLOOD PRESSURE: 90 MMHG | RESPIRATION RATE: 17 BRPM | BODY MASS INDEX: 34.66 KG/M2

## 2022-06-30 DIAGNOSIS — F25.9 SCHIZOAFFECTIVE DISORDER, UNSPECIFIED TYPE: ICD-10-CM

## 2022-06-30 DIAGNOSIS — M54.81 OCCIPITAL NEURALGIA, UNSPECIFIED LATERALITY: ICD-10-CM

## 2022-06-30 DIAGNOSIS — R51.9 UNILATERAL OCCIPITAL HEADACHE: Primary | ICD-10-CM

## 2022-06-30 DIAGNOSIS — F95.9 TIC DISORDER, UNSPECIFIED: ICD-10-CM

## 2022-06-30 DIAGNOSIS — R44.0 AUDITORY HALLUCINATIONS: ICD-10-CM

## 2022-06-30 DIAGNOSIS — G43.019 INTRACTABLE MIGRAINE WITHOUT AURA AND WITHOUT STATUS MIGRAINOSUS: ICD-10-CM

## 2022-06-30 PROCEDURE — 1159F PR MEDICATION LIST DOCUMENTED IN MEDICAL RECORD: ICD-10-PCS | Mod: CPTII,,, | Performed by: PSYCHIATRY & NEUROLOGY

## 2022-06-30 PROCEDURE — 1159F MED LIST DOCD IN RCRD: CPT | Mod: CPTII,,, | Performed by: PSYCHIATRY & NEUROLOGY

## 2022-06-30 PROCEDURE — 99999 PR PBB SHADOW E&M-EST. PATIENT-LVL IV: ICD-10-PCS | Mod: PBBFAC,,, | Performed by: PSYCHIATRY & NEUROLOGY

## 2022-06-30 PROCEDURE — 99204 PR OFFICE/OUTPT VISIT, NEW, LEVL IV, 45-59 MIN: ICD-10-PCS | Mod: 25,S$PBB,, | Performed by: PSYCHIATRY & NEUROLOGY

## 2022-06-30 PROCEDURE — 99214 OFFICE O/P EST MOD 30 MIN: CPT | Mod: PBBFAC,PO | Performed by: PSYCHIATRY & NEUROLOGY

## 2022-06-30 PROCEDURE — 64450 NJX AA&/STRD OTHER PN/BRANCH: CPT | Mod: 59,S$PBB,LT, | Performed by: PSYCHIATRY & NEUROLOGY

## 2022-06-30 PROCEDURE — 3075F PR MOST RECENT SYSTOLIC BLOOD PRESS GE 130-139MM HG: ICD-10-PCS | Mod: CPTII,,, | Performed by: PSYCHIATRY & NEUROLOGY

## 2022-06-30 PROCEDURE — 3008F PR BODY MASS INDEX (BMI) DOCUMENTED: ICD-10-PCS | Mod: CPTII,,, | Performed by: PSYCHIATRY & NEUROLOGY

## 2022-06-30 PROCEDURE — 99204 OFFICE O/P NEW MOD 45 MIN: CPT | Mod: 25,S$PBB,, | Performed by: PSYCHIATRY & NEUROLOGY

## 2022-06-30 PROCEDURE — 64405 NJX AA&/STRD GR OCPL NRV: CPT | Mod: S$PBB,LT,, | Performed by: PSYCHIATRY & NEUROLOGY

## 2022-06-30 PROCEDURE — 3008F BODY MASS INDEX DOCD: CPT | Mod: CPTII,,, | Performed by: PSYCHIATRY & NEUROLOGY

## 2022-06-30 PROCEDURE — 99999 PR PBB SHADOW E&M-EST. PATIENT-LVL IV: CPT | Mod: PBBFAC,,, | Performed by: PSYCHIATRY & NEUROLOGY

## 2022-06-30 PROCEDURE — 3075F SYST BP GE 130 - 139MM HG: CPT | Mod: CPTII,,, | Performed by: PSYCHIATRY & NEUROLOGY

## 2022-06-30 PROCEDURE — 64405 NJX AA&/STRD GR OCPL NRV: CPT | Mod: PBBFAC,PO | Performed by: PSYCHIATRY & NEUROLOGY

## 2022-06-30 PROCEDURE — 64450 NJX AA&/STRD OTHER PN/BRANCH: CPT | Mod: PBBFAC,LT,51 | Performed by: PSYCHIATRY & NEUROLOGY

## 2022-06-30 PROCEDURE — 3080F DIAST BP >= 90 MM HG: CPT | Mod: CPTII,,, | Performed by: PSYCHIATRY & NEUROLOGY

## 2022-06-30 PROCEDURE — 1160F PR REVIEW ALL MEDS BY PRESCRIBER/CLIN PHARMACIST DOCUMENTED: ICD-10-PCS | Mod: CPTII,,, | Performed by: PSYCHIATRY & NEUROLOGY

## 2022-06-30 PROCEDURE — 64450 PR NERVE BLOCK INJ, ANES/STEROID, OTHER PERIPHERAL: ICD-10-PCS | Mod: 59,S$PBB,LT, | Performed by: PSYCHIATRY & NEUROLOGY

## 2022-06-30 PROCEDURE — 1160F RVW MEDS BY RX/DR IN RCRD: CPT | Mod: CPTII,,, | Performed by: PSYCHIATRY & NEUROLOGY

## 2022-06-30 PROCEDURE — 3080F PR MOST RECENT DIASTOLIC BLOOD PRESSURE >= 90 MM HG: ICD-10-PCS | Mod: CPTII,,, | Performed by: PSYCHIATRY & NEUROLOGY

## 2022-06-30 PROCEDURE — 64405 PR NERVE BLOCK INJ, ANES/STEROID, OCCIPITAL: ICD-10-PCS | Mod: S$PBB,LT,, | Performed by: PSYCHIATRY & NEUROLOGY

## 2022-06-30 RX ORDER — RISPERIDONE 0.5 MG/1
TABLET ORAL
COMMUNITY

## 2022-06-30 RX ORDER — TRAZODONE HYDROCHLORIDE 50 MG/1
50 TABLET ORAL NIGHTLY
COMMUNITY

## 2022-06-30 RX ORDER — DIVALPROEX SODIUM 500 MG/1
500 TABLET, FILM COATED, EXTENDED RELEASE ORAL DAILY
COMMUNITY

## 2022-06-30 RX ORDER — ATOMOXETINE 40 MG/1
40 CAPSULE ORAL DAILY
COMMUNITY

## 2022-06-30 RX ORDER — SERTRALINE HYDROCHLORIDE 100 MG/1
100 TABLET, FILM COATED ORAL DAILY
COMMUNITY

## 2022-06-30 RX ORDER — HYDROXYZINE PAMOATE 50 MG/1
50 CAPSULE ORAL 4 TIMES DAILY
COMMUNITY

## 2022-06-30 NOTE — PROCEDURES
Procedures     Procedure:   Greater and Lesser occipital Nerve block left  A time out was conducted just before the start of the procedure to verify the correct patient and procedure, procedure location, and all relevant critical information.   After risks and benefits were explained including bleeding, infection, worsening of the pain, damage to the area being injected, weakness, allergic reaction to medications, vascular injection, and nerve damage, and verbal consent was obtained. All questions were answered.   The area of the left occipital nerve were identified and the skin prepped three times with alcohol and the alcohol allowed to dry. Anatomical landmark of occipital protuberance and mastoid identified and area 2cm lateral to external occipital protuberance located, next a 27 gauge 1 inch needle was placed in the area and after negative aspiration, medication was injected. Procedure repeated in the area of left lesser occipital nerve which is 1/3 the distance between mastoid and external occipital protuberance injected after negative aspiration.   Distribution pattern of pain consistent with the referral pattern of trigger points identified   Focal tenderness of multiple trigger points are identified by examination   Medication used: Lidocaine 1% and Bupivacaine 0.5%.   post procedure  no cutaneous allodynia present.   All questions answered and patient had no complications from procedure  Naomy Gregorio MD   Board Certified Neurologist   [facility] Certified Headache Medicine

## 2022-06-30 NOTE — PROGRESS NOTES
"[facility]    Chief complaint: migraine , headache   Referred by: Aaareferral Self  No address on file     History of Present Illness:    36Y RHM with depression, schizoaffective disorder,  migraine who presents for initial evaluation of headache. He is here with his wife and also with his daughter. They all are able to provide history, but mainly from patient. He mentions that he had a mental breakdown and was diagnosed with schizoaffective disorder. He mentions that he hears things that are not there and he describes it as "damage to the brain". He mentions that he has tics as well since then. He has had since then also stabbing pain in the Left occipital area. The pain is does not happen at night, only during the day. He mentions that if he goes to bed the stabbnig pain will not occur , but then the tics wake him up. He mentions that there is no predictability to them. He can just jump up from the severe pain. The pain can last for seconds to minutes and will come and go in severity. He can have some behavior like rocking back and forth as well. The pain can go up to the top of head or so. He does not know any trigger to it. When the stabbing pain occurs he might stay tender. He will get irritable during the pain.  The pain can occur about 10-12 times a day coming and going. He is interested in neuroimaging as since all this with the auditory hallucinations he never had any head imaging and the occipital pain started around the same time as well.      Also has migraine attacks which are more bitemporal which are happening . He is treating migraine with OTC medication like excedrin which will make it go away. He menitons that it's mainly a pressure pain, throbbing pain, photophobia, phonophobia, sometimes nausea, he has only had emesis once. He metnions that the rest of the time he is headache free. Can wake up with them or during the day occur.     Headache history  Age at " "onset and course over time: over 2 years now of the left occipital short lived pain, migraine has been ongoing for 10-15 years now or so   Location: left occipital area the stabbing/pressure pain lasting seconds to a minute or two , Rt temporal area around the eye is the migraine for many years (this is throbbing/pounding pain)  Character: sharp, pressure, stabbing  Intensity: 8-10/10 when they occur, currently 0/10   Frequency: seconds to minutes   Duration: seconds to minutes of the stabbing pain, migraine untreated hours, but treated within 1-2 hours   Aura: none   Associated symptoms: short lived stabbing pain left occipital pain, migraine has light/sound/smell sensitivities, nausea/vomiting   Other neurologic symptoms: difficulty memory, concentration, difficulty relaxation -unrelated to headache  No neck pain  Neck movements do not cause or maneuvers cause the left occipital pain  No autonomic features  No positional component   No tinnitus   Precipitating factors: foods, stress  Alleviating factors: sleep, darkness,  medications  Aggravating factors: exposure to noise,bending over,stress  Family history of headache: mother , maternal aunt   ER/UC visits: none recently   Caffeine:  "depens on how thirsty I am"  Sleep: snoring     Medication history:  Acute:  excedrin- resolves migraine     Currently for psychiatry on:  Risperdal 0.5mg  depakote 500mg  Trazodone 50mg   strattera  40mg   Hydroxyzine   zoloft 100mg     MIDAS: 20    Care team - primary and psychiatry/psychology outside our system    Neuroimaging and other studies:   No results found for this or any previous visit.      ROS: The fourteen point review of system was performed.   Constitutional:  +fatigue   Eyes:                        +blurry vision   ENT:   Denies hearing loss, infection, carcinoma or other diseases excepting any listed above.   Cardiovascular:  Denies stroke, CAD, arrhythmia, CHF or other disease excepting any listed " above.  Pulmonary:  Denies dyspnea, COPD, RAD or infection or neoplasm excepting any listed above.  Gastrointestinal: Denies ulcer disease, liver or other disease excepting any listed above.  Skin:   Denies rash, skin cancer, or other cutaneous disorder excepting any listed above.  Neurological:  See HPI  Musculoskeletal: Denies RA, fractures or other joint or skeletal problems excepting any listed above.  Heme/Lymphatic: Denies any blood or lymph system neoplasm or disorder excepting any listed above.  Endocrine:  Denies any thyroid or other disorders, excepting any listed above.  Allergic/Immuno: Denies any autoimmune disease or allergy excepting any listed above.  Psychiatric:  +depression, auditory hallucinations, schizoaffective disorder  Urologic:  Denies any difficulties with the urinary system or sexual function except noted above.    Past Medical History:   Diagnosis Date    Depression     Headache        No past surgical history on file.      No family history on file.    Social history:  On disability     Has a child   Social History     Tobacco Use    Smoking status: Never Smoker    Smokeless tobacco: Never Used   Substance Use Topics    Alcohol use: No    Drug use: No         Review of patient's allergies indicates:  No Known Allergies      Current Outpatient Medications:     atomoxetine (STRATTERA) 40 MG capsule, Take 40 mg by mouth once daily., Disp: , Rfl:     divalproex ER (DEPAKOTE) 500 MG Tb24, Take 500 mg by mouth once daily., Disp: , Rfl:     hydrOXYzine pamoate (VISTARIL) 50 MG Cap, Take 50 mg by mouth 4 (four) times daily., Disp: , Rfl:     risperiDONE (RISPERDAL) 0.5 MG Tab, Take by mouth., Disp: , Rfl:     sertraline (ZOLOFT) 100 MG tablet, Take 100 mg by mouth once daily., Disp: , Rfl:     traZODone (DESYREL) 50 MG tablet, Take 50 mg by mouth every evening., Disp: , Rfl:     sumatriptan (IMITREX) 50 MG tablet, Take 1 tablet (50 mg total) by mouth daily as needed for  Migraine. (Patient not taking: Reported on 6/30/2022), Disp: 6 tablet, Rfl: 0      PHYSICAL EXAMINATION  Vitals:    06/30/22 0948   BP: (!) 132/90   Pulse: 110   Resp: 17     General: The patient is a well-developed, well-nourished looking of stated age in no acute distress.  Head: Normocephalic, atraumatic  Eyes, ears, nose and throat: normal.  Neck: Supple  Cardiovascular: No carotid bruits.  Regular rate and rhythm.   TTP over left occipitalis region/occipital nerve area.   Full ROM in neck   No cervical myofascial pain   No tics noted motor or vocal   NEUROLOGIC EXAM:  MENTAL: The patient is awake, alert and oriented times to time, place, location and situation. Intact recent memory, attention, concentration. Language and speech are normal. No aphasia, no dysarthria  CRANIAL NERVES:   CN II: visual fields are intact to confrontation with no defects;  funduscopic examination is within normal limits without evidence of papilledema and signs of venous pulsations.  Pupils are equal, round and reactive to light and accommodation.    III, IV and VI: extraocular movements are intact to all directions of gaze with normal convergence.  V: facial sensation is intact to light touch in divisions V1 through V3.    VII: Facial muscle strength is intact to eyebrow raising, forceful eyelid closure, and cheek puffing.    VIII: Hearing acuity is intact to finger rub.  IX, X: palate rises symmetrically and uvula midline.    XI: Sternocleidomastoid and trapezius strength are 5/5 bilaterally.    XII: Tongue protrudes midline without atrophy or fasciculations.   MOTOR EXAM: No pronator drift,  shows normal strength ( 5/5 strength )in all 4 extremities. Tone is normal.   SENSORY EXAM: intact  light touch, position bilaterally.  CEREBELLAR EXAM: shows normal finger-to-nose and heel-to-shin.   DEEP TENDON REFLEXES:  trace in brachioradialis, biceps, triceps, knee jerks, ankle jerks, downgoing toes b/l. No abnormal reflexes are  present.  GAIT/STANCE: Romberg Negative.  Standard gait was normal with normal stride, arm swing and turning.  No gait ataxia.       Impression:  Migraine without aura, low frequency episodic- strong family history , for about 10 -15 years now, they are only a few times a month and he treats with OTC combination migraine medication and it resolves attacks. He is worried as part of his mental health , he had self harming behavior where he would punch his temples and hit himself, does not recall any acute worsening or increase of headache from the injuries. Neurological examination does not show any focality, he has generalized dropped reflexes, but otherwise not focal abnormality.     Left occipital neuralgia - since mental health event he had and is happening 3-4 times a week of left occipital connie pain that is shooting going up to vertex about 10-12 times a day, never wakes him up from sleep, the occipital area on left feels swollen and is very tender and pressing can cause headache.  Discussed this is occipital neuralgia and recommend medically necessary ONB on left side. Discussed the procedure at length and known complications/side effects. Patient and wife agreed on procedure. Written consent obtained.     Comorbidities:  depression, ADHD, schizoaffective disorder, anxiety, self harming behaviors in past - follows with psychiatry   Tics- for a few years reports they are verbal and motor - did not witness them during our visit.     Plan:   1- For left occipital neuralgia - will do medically necessary ONB on left.     2- Monitor migraine - if excedrin resolving headache and needing less than 10 days a month can continue it , but if not resolving always then can consider prescription migraine prevention.     3- continue care with psychiatry and psychology       RTC PRN         Naomy Gregorio MD   Board Certified Neurologist  [facility] Certified Headache Medicine     I spent  47  minutes on day of this  encounter preparing, treating, and managing this patient with left occipital neuralgia and migraine without aura, depression, ADHD, schizoaffective disorder, anxiety, tics.

## 2022-07-13 ENCOUNTER — HOSPITAL ENCOUNTER (OUTPATIENT)
Dept: RADIOLOGY | Facility: HOSPITAL | Age: 37
Discharge: HOME | End: 2022-07-13
Attending: PSYCHIATRY & NEUROLOGY
Payer: MEDICAID

## 2022-07-13 DIAGNOSIS — R51.9 UNILATERAL OCCIPITAL HEADACHE: ICD-10-CM

## 2022-07-13 PROCEDURE — 70551 MRI BRAIN STEM W/O DYE: CPT | Mod: 26,,, | Performed by: RADIOLOGY

## 2022-07-13 PROCEDURE — 70551 MRI BRAIN WITHOUT CONTRAST: ICD-10-PCS | Mod: 26,,, | Performed by: RADIOLOGY

## 2022-07-13 PROCEDURE — 70551 MRI BRAIN STEM W/O DYE: CPT | Mod: TC,PO

## 2022-09-01 ENCOUNTER — OFFICE VISIT (OUTPATIENT)
Dept: NEUROLOGY | Facility: CLINIC | Age: 37
End: 2022-09-01
Payer: MEDICAID

## 2022-09-01 VITALS
BODY MASS INDEX: 33.93 KG/M2 | DIASTOLIC BLOOD PRESSURE: 90 MMHG | WEIGHT: 203.94 LBS | RESPIRATION RATE: 16 BRPM | HEART RATE: 99 BPM | SYSTOLIC BLOOD PRESSURE: 146 MMHG

## 2022-09-01 DIAGNOSIS — G43.009 MIGRAINE WITHOUT AURA AND WITHOUT STATUS MIGRAINOSUS, NOT INTRACTABLE: ICD-10-CM

## 2022-09-01 DIAGNOSIS — M54.81 OCCIPITAL NEURALGIA OF LEFT SIDE: Primary | ICD-10-CM

## 2022-09-01 PROCEDURE — 99213 OFFICE O/P EST LOW 20 MIN: CPT | Mod: PBBFAC,PO | Performed by: PSYCHIATRY & NEUROLOGY

## 2022-09-01 PROCEDURE — 3080F PR MOST RECENT DIASTOLIC BLOOD PRESSURE >= 90 MM HG: ICD-10-PCS | Mod: CPTII,,, | Performed by: PSYCHIATRY & NEUROLOGY

## 2022-09-01 PROCEDURE — 1160F RVW MEDS BY RX/DR IN RCRD: CPT | Mod: CPTII,,, | Performed by: PSYCHIATRY & NEUROLOGY

## 2022-09-01 PROCEDURE — 1160F PR REVIEW ALL MEDS BY PRESCRIBER/CLIN PHARMACIST DOCUMENTED: ICD-10-PCS | Mod: CPTII,,, | Performed by: PSYCHIATRY & NEUROLOGY

## 2022-09-01 PROCEDURE — 99999 PR PBB SHADOW E&M-EST. PATIENT-LVL III: ICD-10-PCS | Mod: PBBFAC,,, | Performed by: PSYCHIATRY & NEUROLOGY

## 2022-09-01 PROCEDURE — 3077F SYST BP >= 140 MM HG: CPT | Mod: CPTII,,, | Performed by: PSYCHIATRY & NEUROLOGY

## 2022-09-01 PROCEDURE — 99999 PR PBB SHADOW E&M-EST. PATIENT-LVL III: CPT | Mod: PBBFAC,,, | Performed by: PSYCHIATRY & NEUROLOGY

## 2022-09-01 PROCEDURE — 3077F PR MOST RECENT SYSTOLIC BLOOD PRESSURE >= 140 MM HG: ICD-10-PCS | Mod: CPTII,,, | Performed by: PSYCHIATRY & NEUROLOGY

## 2022-09-01 PROCEDURE — 3080F DIAST BP >= 90 MM HG: CPT | Mod: CPTII,,, | Performed by: PSYCHIATRY & NEUROLOGY

## 2022-09-01 PROCEDURE — 99213 PR OFFICE/OUTPT VISIT, EST, LEVL III, 20-29 MIN: ICD-10-PCS | Mod: S$PBB,,, | Performed by: PSYCHIATRY & NEUROLOGY

## 2022-09-01 PROCEDURE — 3008F PR BODY MASS INDEX (BMI) DOCUMENTED: ICD-10-PCS | Mod: CPTII,,, | Performed by: PSYCHIATRY & NEUROLOGY

## 2022-09-01 PROCEDURE — 1159F PR MEDICATION LIST DOCUMENTED IN MEDICAL RECORD: ICD-10-PCS | Mod: CPTII,,, | Performed by: PSYCHIATRY & NEUROLOGY

## 2022-09-01 PROCEDURE — 3008F BODY MASS INDEX DOCD: CPT | Mod: CPTII,,, | Performed by: PSYCHIATRY & NEUROLOGY

## 2022-09-01 PROCEDURE — 1159F MED LIST DOCD IN RCRD: CPT | Mod: CPTII,,, | Performed by: PSYCHIATRY & NEUROLOGY

## 2022-09-01 PROCEDURE — 99213 OFFICE O/P EST LOW 20 MIN: CPT | Mod: S$PBB,,, | Performed by: PSYCHIATRY & NEUROLOGY

## 2022-09-01 RX ORDER — SUMATRIPTAN 50 MG/1
50 TABLET, FILM COATED ORAL DAILY PRN
Qty: 12 TABLET | Refills: 6 | Status: SHIPPED | OUTPATIENT
Start: 2022-09-01 | End: 2023-02-17 | Stop reason: ALTCHOICE

## 2022-09-01 NOTE — PROGRESS NOTES
[facility]    Chief complaint: occipital neuralgia     History of Present Illness:    36Y RHM with depression, schizoaffective disorder,  migraine who presents for further evaluation of left occipital neuralgia. He mentions that the ONB performed last time resolved the symptoms . He has not had the left sided occipital neuralgia any more. He is happy about this. He is having 1-2 migraine attacks per month and mostly ibuprofen will resolve them, but at times they might be more intesne and ibuprofen does not work. He had in the past sumatriptan that worked very well and would like to have it again. His main concern and that of his wife is the behavioral side of things. He is interested in getting tested for autism spectrum disorder. He mentions that all throgh his life he had developmental delays, as adult can not handle change, when gets frustrated has self harming behavior, difficulty in social settings, has difficulty processing information and that gets him in trouble with work. He has psychiatry and they mentioned he has features, but they do not do testing . He is interested to know where he could get tested for it.     Headache history  Age at onset and course over time: over 2 years now of the left occipital short lived pain, migraine has been ongoing for 10-15 years now or so   Location: left occipital area the stabbing/pressure pain lasting seconds to a minute or two , Rt temporal area around the eye is the migraine for many years (this is throbbing/pounding pain)  Character: sharp, pressure, stabbing  Intensity: 8-10/10 when they occur, currently 0/10   Frequency: seconds to minutes   Duration: seconds to minutes of the stabbing pain, migraine untreated hours, but treated within 1-2 hours   Aura: none   Associated symptoms: short lived stabbing pain left occipital pain, migraine has light/sound/smell sensitivities, nausea/vomiting   Other neurologic symptoms:  "difficulty memory, concentration, difficulty relaxation -unrelated to headache  No neck pain  Neck movements do not cause or maneuvers cause the left occipital pain  No autonomic features  No positional component   No tinnitus   Precipitating factors: foods, stress  Alleviating factors: sleep, darkness,  medications  Aggravating factors: exposure to noise,bending over,stress  Family history of headache: mother , maternal aunt   ER/UC visits: none recently   Caffeine:  "depens on how thirsty I am"  Sleep: snoring     Medication history:  Acute:  excedrin- resolves migraine   Ibuprofen- resolves migraine most of the time     Currently for psychiatry on:  Risperdal 0.5mg  depakote 500mg  Trazodone 50mg   strattera  40mg   Hydroxyzine   zoloft 100mg     Left ONB -   MIDAS: 20    Care team - primary and psychiatry/psychology outside our system    Neuroimaging and other studies:   No results found for this or any previous visit.      ROS: The fourteen point review of system was performed.   Constitutional:  +fatigue   Eyes:                        +blurry vision   ENT:   Denies hearing loss, infection, carcinoma or other diseases excepting any listed above.   Cardiovascular:  Denies stroke, CAD, arrhythmia, CHF or other disease excepting any listed above.  Pulmonary:  Denies dyspnea, COPD, RAD or infection or neoplasm excepting any listed above.  Gastrointestinal: Denies ulcer disease, liver or other disease excepting any listed above.  Skin:   Denies rash, skin cancer, or other cutaneous disorder excepting any listed above.  Neurological:  See HPI  Musculoskeletal: Denies RA, fractures or other joint or skeletal problems excepting any listed above.  Heme/Lymphatic: Denies any blood or lymph system neoplasm or disorder excepting any listed above.  Endocrine:  Denies any thyroid or other disorders, excepting any listed above.  Allergic/Immuno: Denies any autoimmune disease or allergy excepting any listed " above.  Psychiatric:  +depression, auditory hallucinations, schizoaffective disorder  Urologic:  Denies any difficulties with the urinary system or sexual function except noted above.    Past Medical History:   Diagnosis Date    Depression     Headache        No past surgical history on file.      No family history on file.    Social history:  On disability     Has a child   Social History     Tobacco Use    Smoking status: Never    Smokeless tobacco: Never   Substance Use Topics    Alcohol use: No    Drug use: No         Review of patient's allergies indicates:  No Known Allergies      Current Outpatient Medications:     atomoxetine (STRATTERA) 40 MG capsule, Take 40 mg by mouth once daily., Disp: , Rfl:     divalproex 500 MG Tb24, Take 500 mg by mouth once daily., Disp: , Rfl:     hydrOXYzine pamoate (VISTARIL) 50 MG Cap, Take 50 mg by mouth 4 (four) times daily., Disp: , Rfl:     risperiDONE (RISPERDAL) 0.5 MG Tab, Take by mouth., Disp: , Rfl:     sertraline (ZOLOFT) 100 MG tablet, Take 100 mg by mouth once daily., Disp: , Rfl:     traZODone (DESYREL) 50 MG tablet, Take 50 mg by mouth every evening., Disp: , Rfl:     sumatriptan (IMITREX) 50 MG tablet, Take 1 tablet (50 mg total) by mouth daily as needed for Migraine. (Patient not taking: No sig reported), Disp: 6 tablet, Rfl: 0      PHYSICAL EXAMINATION  Vitals:    09/01/22 1102   BP: (!) 146/90   Pulse: 99   Resp: 16     General: The patient is a well-developed, well-nourished looking of stated age in no acute distress.  Head: Normocephalic, atraumatic  Eyes, ears, nose and throat: normal.  Neck: Supple  Cardiovascular: No carotid bruits.  Regular rate and rhythm.   TTP over left occipitalis region/occipital nerve area.   Full ROM in neck   No cervical myofascial pain   No tics noted motor or vocal   NEUROLOGIC EXAM:  MENTAL: The patient is awake, alert and oriented times to time, place, location and situation. Intact recent memory, attention,  concentration. Language and speech are normal. No aphasia, no dysarthria  CRANIAL NERVES:   CN II: visual fields are intact to confrontation with no defects;  funduscopic examination is within normal limits without evidence of papilledema and signs of venous pulsations.  Pupils are equal, round and reactive to light and accommodation.    III, IV and VI: extraocular movements are intact to all directions of gaze with normal convergence.  V: facial sensation is intact to light touch in divisions V1 through V3.    VII: Facial muscle strength is intact to eyebrow raising, forceful eyelid closure, and cheek puffing.    VIII: Hearing acuity is intact to finger rub.  IX, X: palate rises symmetrically and uvula midline.    XI: Sternocleidomastoid and trapezius strength are 5/5 bilaterally.    XII: Tongue protrudes midline without atrophy or fasciculations.   MOTOR EXAM: No pronator drift,  shows normal strength ( 5/5 strength )in all 4 extremities. Tone is normal.   SENSORY EXAM: intact  light touch, position bilaterally.  CEREBELLAR EXAM: shows normal finger-to-nose and heel-to-shin.   DEEP TENDON REFLEXES:  trace in brachioradialis, biceps, triceps, knee jerks, ankle jerks, downgoing toes b/l. No abnormal reflexes are present.  GAIT/STANCE: Romberg Negative.  Standard gait was normal with normal stride, arm swing and turning.  No gait ataxia.       Impression:  Migraine without aura, low frequency episodic- strong family history , for about 10 -15 years now, they are only a few times a month and he treats with OTC combination migraine medication and it resolves attacks. He is worried as part of his mental health , he had self harming behavior where he would punch his temples and hit himself, does not recall any acute worsening or increase of headache from the injuries. Neurological examination does not show any focality, he has generalized dropped reflexes, but otherwise not focal abnormality.     Left occipital neuralgia  - since mental health event he had and is happening 3-4 times a week of left occipital connie pain that is shooting going up to vertex about 10-12 times a day, never wakes him up from sleep, the occipital area on left feels swollen and is very tender and pressing can cause headache.  Discussed this is occipital neuralgia and recommend medically necessary ONB on left side. Discussed the procedure at length and known complications/side effects. Patient and wife agreed on procedure. Written consent obtained.     Comorbidities:  depression, ADHD, schizoaffective disorder, anxiety, self harming behaviors in past - follows with psychiatry   Tics- for a few years reports they are verbal and motor - did not witness them during our visit.     Plan:   1- For left occipital neuralgia - call if an occipital nerve block is needed.     2- At the onset of migraine sumatriptan 50mg , can repeat after 2 hours. Max 2/day.   If you always need the second dose, we will need to increase dose to 100mg.     3- continue care with psychiatry and psychology       RTC PRN and call for ONB.         Naomy Gregorio MD   Board Certified Neurologist  [facility] Certified Headache Medicine     I spent  25 minutes on day of this encounter preparing, treating, and managing this patient with left occipital neuralgia and migraine without aura, depression, ADHD, schizoaffective disorder, anxiety, tics.

## 2022-09-01 NOTE — PATIENT INSTRUCTIONS
1- For left occipital neuralgia - call if an occipital nerve block is needed.     2- At the onset of migraine sumatriptan 50mg , can repeat after 2 hours. Max 2/day.   If you always need the second dose, we will need to increase dose to 100mg.

## 2022-09-09 ENCOUNTER — PATIENT MESSAGE (OUTPATIENT)
Dept: NEUROLOGY | Facility: CLINIC | Age: 37
End: 2022-09-09
Payer: MEDICAID

## 2022-09-19 ENCOUNTER — PATIENT MESSAGE (OUTPATIENT)
Dept: NEUROLOGY | Facility: CLINIC | Age: 37
End: 2022-09-19
Payer: MEDICAID

## 2023-02-15 ENCOUNTER — TELEPHONE (OUTPATIENT)
Dept: NEUROLOGY | Facility: CLINIC | Age: 38
End: 2023-02-15
Payer: MEDICAID

## 2023-02-15 NOTE — TELEPHONE ENCOUNTER
Patient returned call. Informed that he would have to establish care and then if the provider feels that is the appropriate treatment then an order would have to be placed and authorization obtained from his insurance now to do nerve blocks. Verbalized understanding. Date/Time confirmed. Gave information to  to set up due to insurance.

## 2023-02-15 NOTE — TELEPHONE ENCOUNTER
----- Message from Miki Crowe sent at 2/15/2023  3:42 PM CST -----  Contact: self  Type: Sooner Appointment Request        Caller is requesting a sooner appointment. Caller declined first available appointment listed below. Caller will not accept being placed on the waitlist and is requesting a message be sent to doctor.        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 Plz call pt to get scheduled for injections used to see Dr. Rizo. Thanks

## 2023-02-15 NOTE — TELEPHONE ENCOUNTER
Called patient to notify that he will have to establish care with new provider, then orders will have to be placed because due to insurance the nerve blocks have to have authorization now but there was no answer and voice mail not set up.     Will send message in My Chart.

## 2023-02-17 ENCOUNTER — OFFICE VISIT (OUTPATIENT)
Dept: NEUROLOGY | Facility: CLINIC | Age: 38
End: 2023-02-17
Payer: MEDICAID

## 2023-02-17 VITALS — DIASTOLIC BLOOD PRESSURE: 81 MMHG | HEART RATE: 109 BPM | SYSTOLIC BLOOD PRESSURE: 114 MMHG

## 2023-02-17 DIAGNOSIS — M54.81 OCCIPITAL NEURALGIA OF LEFT SIDE: Primary | ICD-10-CM

## 2023-02-17 PROCEDURE — 99213 PR OFFICE/OUTPT VISIT, EST, LEVL III, 20-29 MIN: ICD-10-PCS | Mod: S$PBB,,, | Performed by: PHYSICIAN ASSISTANT

## 2023-02-17 PROCEDURE — 1159F PR MEDICATION LIST DOCUMENTED IN MEDICAL RECORD: ICD-10-PCS | Mod: CPTII,,, | Performed by: PHYSICIAN ASSISTANT

## 2023-02-17 PROCEDURE — 99213 OFFICE O/P EST LOW 20 MIN: CPT | Mod: S$PBB,,, | Performed by: PHYSICIAN ASSISTANT

## 2023-02-17 PROCEDURE — 99213 OFFICE O/P EST LOW 20 MIN: CPT | Mod: PBBFAC,PO | Performed by: PHYSICIAN ASSISTANT

## 2023-02-17 PROCEDURE — 3079F PR MOST RECENT DIASTOLIC BLOOD PRESSURE 80-89 MM HG: ICD-10-PCS | Mod: CPTII,,, | Performed by: PHYSICIAN ASSISTANT

## 2023-02-17 PROCEDURE — 99999 PR PBB SHADOW E&M-EST. PATIENT-LVL III: ICD-10-PCS | Mod: PBBFAC,,, | Performed by: PHYSICIAN ASSISTANT

## 2023-02-17 PROCEDURE — 1159F MED LIST DOCD IN RCRD: CPT | Mod: CPTII,,, | Performed by: PHYSICIAN ASSISTANT

## 2023-02-17 PROCEDURE — 1160F RVW MEDS BY RX/DR IN RCRD: CPT | Mod: CPTII,,, | Performed by: PHYSICIAN ASSISTANT

## 2023-02-17 PROCEDURE — 3074F PR MOST RECENT SYSTOLIC BLOOD PRESSURE < 130 MM HG: ICD-10-PCS | Mod: CPTII,,, | Performed by: PHYSICIAN ASSISTANT

## 2023-02-17 PROCEDURE — 3074F SYST BP LT 130 MM HG: CPT | Mod: CPTII,,, | Performed by: PHYSICIAN ASSISTANT

## 2023-02-17 PROCEDURE — 1160F PR REVIEW ALL MEDS BY PRESCRIBER/CLIN PHARMACIST DOCUMENTED: ICD-10-PCS | Mod: CPTII,,, | Performed by: PHYSICIAN ASSISTANT

## 2023-02-17 PROCEDURE — 99999 PR PBB SHADOW E&M-EST. PATIENT-LVL III: CPT | Mod: PBBFAC,,, | Performed by: PHYSICIAN ASSISTANT

## 2023-02-17 PROCEDURE — 3079F DIAST BP 80-89 MM HG: CPT | Mod: CPTII,,, | Performed by: PHYSICIAN ASSISTANT

## 2023-02-17 RX ORDER — TIZANIDINE 2 MG/1
TABLET ORAL
Qty: 5 TABLET | Refills: 0 | Status: SHIPPED | OUTPATIENT
Start: 2023-02-17 | End: 2023-02-20

## 2023-02-17 NOTE — PROGRESS NOTES
[facility]  [address] [facility] [address]  [place] [address]  Phone:[phone]  Fax: [phone]   Follow up visit     Patient Name: Cirilo Pichardo  : 1985  MRN:  [id number]  Today: 2023  LOV:  2022 elvira Rizo   chief complaint: Headache    PCP: Primary Doctor No.    Assessment:   Cirilo Pichardo is a 37 y.o. right male with a PMHx of: depression, schizoaffective disorder, self harm behaviour (followed by mental health) and migraine/occipital pain  whom presents solo in follow up for HA. HA appear to be  occipital neuralgia. Noted past mental health history (as above), limiting safe treatment options. Noted he states at present he is safe and is followed by mental health.       ICD-10-CM ICD-9-CM   1. Occipital neuralgia of left side  M54.81 723.8         Plan:   Will try 5 pills of zanaflex, take 1 pill during a day of discomfort at approx 2 hours before bed (as it causes sedation). No driving, no use with etoh. If home can take at onset of discomfort  As he got multi month relief with GONB, I have put in a referral to do these injections.         -I discussed the procedure at length including what to expect and possible adv effects, he agreed        -note he has had this before and did VERY well with it        Defer mental health meds to his established treatment team       All test results as well as any necessary instructions were reviewed and discussed with patient.    Review:  Orders Placed This Encounter    Nerve block    tiZANidine (ZANAFLEX) 2 MG tablet     No orders of the defined types were placed in this encounter.    No orders of the defined types were placed in this encounter.        Patient to return to PCP/other specialists for all other problems  Patient to continue on all medications as Rx'd  Full office note available online   RTO- for LEFT GONB   The patient indicates understanding of these issues and agrees to the plan.    HPI:   Cirilo  "Kylee is a 37 y.o.right handed, male with a PMHx of: depression, schizoaffective disorder, self harm behaviour (followed by mental health) and migraine/occipital pain   whom presents solo in follow up for HA.      At last visit with Dr. Rizo (9/1/2022): suggested GONB if having HA and imitrex to abort HA.   GONB in past gave him 6 months of relief  States he starts to hear voices again (followed by mental health)          -at that point "sharp" occipital pains in the LEFT side of the head   2-3 HD a month, last hours to days   Last time he has this was a few days ago and lasted a few days   No vision change during the event  Time is abortive   No trouble swallowing/ no weakness   "I'd like to get those shots again."  Imaging on file: 7/13/2022 MRI brain-NML   Therapies tried in past: (failures to be marked, if known---why did it fail?)  Medication history:  Acute:  excedrin- resolves migraine   Ibuprofen- resolves migraine most of the time      Meds per psychiatry on:  Risperdal 0.5mg  depakote 500mg  Trazodone 50mg   strattera  40mg   Hydroxyzine   zoloft 100mg       Dr Rizo's notes (9/1/2022):  "History of Present Illness:               36Y RHM with depression, schizoaffective disorder,  migraine who presents for further evaluation of left occipital neuralgia. He mentions that the ONB performed last time resolved the symptoms . He has not had the left sided occipital neuralgia any more. He is happy about this. He is having 1-2 migraine attacks per month and mostly ibuprofen will resolve them, but at times they might be more intesne and ibuprofen does not work. He had in the past sumatriptan that worked very well and would like to have it again. His main concern and that of his wife is the behavioral side of things. He is interested in getting tested for autism spectrum disorder. He mentions that all throgh his life he had developmental delays, as adult can not handle change, when gets frustrated has " "self harming behavior, difficulty in social settings, has difficulty processing information and that gets him in trouble with work. He has psychiatry and they mentioned he has features, but they do not do testing . He is interested to know where he could get tested for it.      Headache history  Age at onset and course over time: over 2 years now of the left occipital short lived pain, migraine has been ongoing for 10-15 years now or so   Location: left occipital area the stabbing/pressure pain lasting seconds to a minute or two , Rt temporal area around the eye is the migraine for many years (this is throbbing/pounding pain)  Character: sharp, pressure, stabbing  Intensity: 8-10/10 when they occur, currently 0/10   Frequency: seconds to minutes   Duration: seconds to minutes of the stabbing pain, migraine untreated hours, but treated within 1-2 hours   Aura: none   Associated symptoms: short lived stabbing pain left occipital pain, migraine has light/sound/smell sensitivities, nausea/vomiting   Other neurologic symptoms: difficulty memory, concentration, difficulty relaxation -unrelated to headache  No neck pain  Neck movements do not cause or maneuvers cause the left occipital pain  No autonomic features  No positional component   No tinnitus   Precipitating factors: foods, stress  Alleviating factors: sleep, darkness,  medications  Aggravating factors: exposure to noise,bending over,stress  Family history of headache: mother , maternal aunt   ER/UC visits: none recently   Caffeine:  "depens on how thirsty I am"  Sleep: snoring "    Medication Reconciliation:   Current Outpatient Medications   Medication Sig Dispense Refill    atomoxetine (STRATTERA) 40 MG capsule Take 40 mg by mouth once daily.      divalproex 500 MG Tb24 Take 500 mg by mouth once daily.      hydrOXYzine pamoate (VISTARIL) 50 MG Cap Take 50 mg by mouth 4 (four) times daily.      risperiDONE (RISPERDAL) 0.5 MG Tab Take by mouth.      sertraline " (ZOLOFT) 100 MG tablet Take 100 mg by mouth once daily.      traZODone (DESYREL) 50 MG tablet Take 50 mg by mouth every evening.       No current facility-administered medications for this visit.     Review of patient's allergies indicates:  No Known Allergies    PMHx:  Past Medical History:   Diagnosis Date    Depression     Headache      No past surgical history on file.    Fhx:  No family history on file.    Shx:   Social History     Socioeconomic History    Marital status:    Tobacco Use    Smoking status: Never    Smokeless tobacco: Never   Substance and Sexual Activity    Alcohol use: No    Drug use: No    Sexual activity: Yes     Partners: Female           Labs:   Reviewed in chart     Imagin2022 MRI brain (report): FINDINGS:  Ventricles and sulci are normal in size for age without evidence of hydrocephalus.     The brain parenchyma appears within normal limits for the patient's age.   Diffusion-weighted images demonstrate no evidence of an acute infarct.   Susceptibility weighted images demonstrate no evidence of acute or chronic hemorrhage. No mass effect or midline shift.     Normal vascular flow voids are preserved.     Bone marrow signal intensity is normal. Small round cystic focus within the left mastoid antrum suggestive of a retention cyst.  The visualized portions of the mastoids are unremarkable.  Orbits are unremarkable.     Impression:     No evidence of acute intracranial abnormality or other significant finding to explain the patient's symptoms..            Other testing:  Reviewed in chart     Note: I have independently reviewed any/all imaging/labs/tests and agree with the report (s) as documented.  Any discrepancies will be as noted/demarcated by free text.  SHANNEN OCHOA 2023                     ROS:   Review Of Systems (questions asked, positive or additions in BOLD)  Gen: Weight change, fatigue/malaise, pyrexia   HEENT: Tinnitus, headache,  blurred vision, eye pain,  diplopia, photophobia,  nose bleeds, congestion, sore throat, jaw pain, scalp pain, neck stiffness   Card: Palpitations, CP   Pulm: SOB, cough   Vas: Easy bruising, easy bleeding   GI: N/V/D/C, incontinence, hematemesis, hematochezia    : incontinence, hematuria   Endocrine: Temp intolerance, polyuria, polydipsia   M/S: Neck pain, myalgia, back pain, joint pain, falls    Neuro: PER HPI   PSY: Memory loss, confusion, depression, anxiety, trouble in sleep, hallucinations          EXAM:   /81 (BP Location: Left arm, Patient Position: Sitting, BP Method: Large (Automatic))   Pulse 109    GEN:  NAD  HEENT: NC/AT, Frontalis was NTTP, temporalis was NTTP,  nares patent, dentition appropriate,  neck supple, trachea midline, Occiput and trapezius NTTP  EXTREM:    no edema present.    NEURO:  Mental Status:  Awake, alert and appropriately oriented to time, place, and person.  Normal attention and concentration.  Speech is fluent and appropriate language function (I.e., comprehension).     Cranial Nerves:     Extraocular movements are intact and without nystagmus.  Visual fields are full to confrontation testing.   Facial movement is symmetric.  Facial sensation is intact.  Hearing is normal. Uvula in midline. FROM of neck in all (6) directions, shoulder shrug symmetrical. Tongue in midline without fasiculation.     Motor:  RUE:appropriate against gravity and medium force as tested 5/5              LUE: appropriate against gravity and medium force as tested 5/5              RLE:appropriate against gravity and medium force as tested 5/5              LLE: appropriate against gravity and medium force as tested 5/5  Tremor/pronator drift not apparent.     finger extension strength was strong bilat     Sensory:  RUE  intact light touch  LUE intact light touch    RLE intact light touch  LLE intact light touch      DTR's:                                            R              L                  Knee jerk 1+ 1+         Coordination:  FTN-WNL.       Gait and Stance: Normal manner of stance and gait function testing.          This document has been electronically signed by Mr. Aneesh Dimas MPA PAKalliC on 2/17/2023, I have personally typed this message using the EMR.       Dr Ej MD  was available during today's visit.     Personal Protective Equipment:    Personal Protective Equipment was used during this encounter including:  mask-surgical and non latex gloves.          CC: Primary Doctor No

## 2023-03-13 ENCOUNTER — PROCEDURE VISIT (OUTPATIENT)
Dept: NEUROLOGY | Facility: CLINIC | Age: 38
End: 2023-03-13
Payer: MEDICAID

## 2023-03-13 VITALS
SYSTOLIC BLOOD PRESSURE: 141 MMHG | DIASTOLIC BLOOD PRESSURE: 78 MMHG | RESPIRATION RATE: 17 BRPM | HEIGHT: 65 IN | HEART RATE: 121 BPM | BODY MASS INDEX: 33.82 KG/M2 | WEIGHT: 203 LBS

## 2023-03-13 DIAGNOSIS — M54.81 OCCIPITAL NEURALGIA OF LEFT SIDE: ICD-10-CM

## 2023-03-13 PROCEDURE — 64405 NJX AA&/STRD GR OCPL NRV: CPT | Mod: PBBFAC,PO,LT | Performed by: PHYSICIAN ASSISTANT

## 2023-03-13 PROCEDURE — 64405 PR NERVE BLOCK INJ, ANES/STEROID, OCCIPITAL: ICD-10-PCS | Mod: S$PBB,LT,, | Performed by: PHYSICIAN ASSISTANT

## 2023-03-13 PROCEDURE — 64450 NJX AA&/STRD OTHER PN/BRANCH: CPT | Mod: 51,S$PBB,LT, | Performed by: PHYSICIAN ASSISTANT

## 2023-03-13 PROCEDURE — 64450 PR NERVE BLOCK INJ, ANES/STEROID, OTHER PERIPHERAL: ICD-10-PCS | Mod: 51,S$PBB,LT, | Performed by: PHYSICIAN ASSISTANT

## 2023-03-13 PROCEDURE — 64405 NJX AA&/STRD GR OCPL NRV: CPT | Mod: S$PBB,LT,, | Performed by: PHYSICIAN ASSISTANT

## 2023-03-13 RX ORDER — BUPIVACAINE HYDROCHLORIDE 2.5 MG/ML
3 INJECTION, SOLUTION EPIDURAL; INFILTRATION; INTRACAUDAL ONCE
Status: COMPLETED | OUTPATIENT
Start: 2023-03-13 | End: 2023-03-13

## 2023-03-13 RX ADMIN — BUPIVACAINE HYDROCHLORIDE 7.5 MG: 2.5 INJECTION, SOLUTION EPIDURAL; INFILTRATION; INTRACAUDAL; PERINEURAL at 09:03

## 2023-03-13 NOTE — PROCEDURES
Procedures  [facility]  [facility]  [address] [place] [address]  Phone:[phone]  Fax: [phone]  GONB Visit     Pre Procedure Dx: HA/Migraine  Post Procedure Dx: HA/Migraine   Procedure note:   GONB (Greater Occipital Nerve Block, CPT: 11290): After informed consent was obtained (a copy was given to office staff to scan into the EMR), the patient was asked to remain in a sitting position with his head resting prone on his chest. The area was prepped using alcohol swabs. 0.25% marcaine (3 mLl) a was drawn up utilizing a 25 gauge needle. The occipital trigger points were palpated utilizing latex gloves, a 30 gauge needle and aspiration occured to ensure no medication was introduced into the blood stream during the technique. The medication was delivered on the LEFT in sites 1) midway between the inion and mastoid along the occipital ridge, 2) 2 finger breaths superior lateral to the first injection and 3) 2 finger breaths superior medial to the first injection. Targeted nerves were the greater and lesser occipital nerves (noting 3 distinct points injected per side of the head). This procedure occurred  unilateral.The patient tolerated the procedure well with no active bleeding, erythema, or discharge. The patient was assessed and allowed to leave after ten minutes.      Gen: NAD   Derm: no drainage  Neuro: AOx3, EOMI, tongue in midline, FROM of all extremities, OOC/gait WNL   Attending available    He tolerated without incident and will schedule an appt if the pain comes back (usually gets 6+ months of relief from these injections).         Aneesh Dimas MPA, PA-C  03/13/2023  Personal Protective Equipment:    Personal Protective Equipment was used during this encounter including;   mask-surgical,  and non latex gloves.

## 2024-01-03 ENCOUNTER — TELEPHONE (OUTPATIENT)
Dept: NEUROLOGY | Facility: CLINIC | Age: 39
End: 2024-01-03
Payer: MEDICAID

## 2024-01-03 NOTE — TELEPHONE ENCOUNTER
2/22/24 at 930, pt confirmed          ----- Message from Lindsay Burleson sent at 1/3/2024 11:58 AM CST -----  Contact: Self  Type:  Sooner Appointment Request    Caller is requesting a sooner appointment.  Caller declined first available appointment listed below.  Caller will not accept being placed on the waitlist and is requesting a message be sent to doctor.    Name of Caller:  Patient  When is the first available appointment?  02/22  Symptoms:  needs f/u for more injections  Would the patient rather a call back or a response via [facility]? Call back  Best Call Back Number:  [phone]  Additional Information:  Can we please call pt back to assist. Thank You.

## 2024-01-31 ENCOUNTER — TELEPHONE (OUTPATIENT)
Dept: NEUROLOGY | Facility: CLINIC | Age: 39
End: 2024-01-31
Payer: MEDICAID

## 2024-01-31 NOTE — TELEPHONE ENCOUNTER
"I unfortunately don't treat these issues, and this appears serious. I called the number listed in the phone mail message (and also tried the number listed in the chart, as these numbers were slightly different), with both phone numbers, I heard a message stating "person has no voice mail set up."    I sent the patient a [employer] message.     SMJ       "

## 2024-01-31 NOTE — TELEPHONE ENCOUNTER
----- Message from Irasema Parra sent at 1/31/2024  8:16 AM CST -----  Regarding: Same Day Appointment Reschedule Request  Contact: patient at [phone]  Type:  Same Day Appointment Reschedule Request    Name of Caller:  patient at [phone] (wife's number)    When is the first available appointment?  02/22  Symptoms:  migraine pain, hear voices, seeing things    Additional Information:  Would like to be seen as soon as possible. Please call and advise. Thank you

## 2024-02-22 ENCOUNTER — OFFICE VISIT (OUTPATIENT)
Dept: NEUROLOGY | Facility: CLINIC | Age: 39
End: 2024-02-22
Payer: MEDICAID

## 2024-02-22 VITALS
HEIGHT: 65 IN | HEART RATE: 103 BPM | BODY MASS INDEX: 39.15 KG/M2 | WEIGHT: 235 LBS | RESPIRATION RATE: 17 BRPM | DIASTOLIC BLOOD PRESSURE: 84 MMHG | SYSTOLIC BLOOD PRESSURE: 132 MMHG | TEMPERATURE: 98 F

## 2024-02-22 DIAGNOSIS — M54.81 OCCIPITAL NEURALGIA OF LEFT SIDE: Primary | ICD-10-CM

## 2024-02-22 DIAGNOSIS — F25.9 SCHIZOAFFECTIVE DISORDER, UNSPECIFIED TYPE: ICD-10-CM

## 2024-02-22 DIAGNOSIS — F41.9 ANXIETY: ICD-10-CM

## 2024-02-22 PROCEDURE — 1160F RVW MEDS BY RX/DR IN RCRD: CPT | Mod: CPTII,,, | Performed by: PHYSICIAN ASSISTANT

## 2024-02-22 PROCEDURE — 3079F DIAST BP 80-89 MM HG: CPT | Mod: CPTII,,, | Performed by: PHYSICIAN ASSISTANT

## 2024-02-22 PROCEDURE — 1159F MED LIST DOCD IN RCRD: CPT | Mod: CPTII,,, | Performed by: PHYSICIAN ASSISTANT

## 2024-02-22 PROCEDURE — 99213 OFFICE O/P EST LOW 20 MIN: CPT | Mod: S$PBB,,, | Performed by: PHYSICIAN ASSISTANT

## 2024-02-22 PROCEDURE — 99214 OFFICE O/P EST MOD 30 MIN: CPT | Mod: PBBFAC,PO | Performed by: PHYSICIAN ASSISTANT

## 2024-02-22 PROCEDURE — 3008F BODY MASS INDEX DOCD: CPT | Mod: CPTII,,, | Performed by: PHYSICIAN ASSISTANT

## 2024-02-22 PROCEDURE — 99999 PR PBB SHADOW E&M-EST. PATIENT-LVL IV: CPT | Mod: PBBFAC,,, | Performed by: PHYSICIAN ASSISTANT

## 2024-02-22 PROCEDURE — 3075F SYST BP GE 130 - 139MM HG: CPT | Mod: CPTII,,, | Performed by: PHYSICIAN ASSISTANT

## 2024-02-22 NOTE — PROGRESS NOTES
[facility]  [address] [place] [address]  Phone:[phone]  Fax: [phone]   Follow up visit     Patient Name: Cirilo Pichardo  : 1985  MRN:  [id number]  Today: 2023  LOV:  3/13/2023  chief complaint: Headache    PCP: Primary Doctor No.    Assessment:   Cirilo Pichardo is a 37 y.o. right male with a PMHx of: depression, schizoaffective disorder, self harm behaviour (followed by mental health) and migraine/occipital pain  whom presents solo in follow up for HA. HA appear to be  occipital neuralgia. Noted past mental health history (as above), limiting safe treatment options. Noted he states at present he is safe and is followed by mental health but would like referral to another mental health counselor.       ICD-10-CM ICD-9-CM   1. Occipital neuralgia of left side  M54.81 723.8   2. Schizoaffective disorder, unspecified type  F25.9 295.70   3. Anxiety  F41.9 300.00         Plan:   Re ordered GONB, will wait for preapproval (L GONB)   Referral to mental health at his request, discussed if ever feeling unsafe or in dire straights, call 911. He agreed       -follow up with his psychiatrist for medication management            All test results as well as any necessary instructions were reviewed and discussed with patient.    Review:  Orders Placed This Encounter    Nerve block    Ambulatory consult to Psychiatry     No orders of the defined types were placed in this encounter.    No orders of the defined types were placed in this encounter.        Patient to return to PCP/other specialists for all other problems  Patient to continue on all medications as Rx'd  Full office note available online   RTO- for LEFT GONB   The patient indicates understanding of these issues and agrees to the plan.    HPI:   Cirilo Pichardo is a 37 y.o.right handed, male with a PMHx of: depression, schizoaffective disorder, self harm behaviour (followed by mental  "health) and migraine/occipital pain   whom presents solo in follow up for HA.      He is late for appt, able to still see him.     At last visit: received GONB. Recently received notification of him hearing/seeing things. I tried calling and directed him to the nearest ER.   HA returned a month ago (back of the head on LEFT), usually at night, almost nightly         -average discomfort 5/10, lasts seconds, then will come back (5x at least)         -depended on mood        -denies weakness/sensory changes/vision changes/dysphagia or any other concerns         -blocks give most relief     As aside:  States he has been having more anger issues lately (states he is safe, others safe)          -states he is followed by psychiatrist but would like another therapist to talk to          -"my last therapist and I just didn't get along"               At last visit with Dr. Rizo (9/1/2022): suggested GONB if having HA and imitrex to abort HA.   GONB in past gave him 6 months of relief  States he starts to hear voices again (followed by mental health)          -at that point "sharp" occipital pains in the LEFT side of the head   2-3 HD a month, last hours to days   Last time he has this was a few days ago and lasted a few days   No vision change during the event  Time is abortive   No trouble swallowing/ no weakness   "I'd like to get those shots again."  Imaging on file: 7/13/2022 MRI brain-NML   Therapies tried in past: (failures to be marked, if known---why did it fail?)  Medication history:  Acute:  excedrin- resolves migraine   Ibuprofen- resolves migraine most of the time      Meds per psychiatry on:  Risperdal 0.5mg  depakote 500mg  Trazodone 50mg   strattera  40mg   Hydroxyzine   zoloft 100mg       Dr Rizo's notes (9/1/2022):  "History of Present Illness:               36Y RHM with depression, schizoaffective disorder,  migraine who presents for further evaluation of left occipital neuralgia. He mentions that the " ONB performed last time resolved the symptoms . He has not had the left sided occipital neuralgia any more. He is happy about this. He is having 1-2 migraine attacks per month and mostly ibuprofen will resolve them, but at times they might be more intesne and ibuprofen does not work. He had in the past sumatriptan that worked very well and would like to have it again. His main concern and that of his wife is the behavioral side of things. He is interested in getting tested for autism spectrum disorder. He mentions that all throgh his life he had developmental delays, as adult can not handle change, when gets frustrated has self harming behavior, difficulty in social settings, has difficulty processing information and that gets him in trouble with work. He has psychiatry and they mentioned he has features, but they do not do testing . He is interested to know where he could get tested for it.      Headache history  Age at onset and course over time: over 2 years now of the left occipital short lived pain, migraine has been ongoing for 10-15 years now or so   Location: left occipital area the stabbing/pressure pain lasting seconds to a minute or two , Rt temporal area around the eye is the migraine for many years (this is throbbing/pounding pain)  Character: sharp, pressure, stabbing  Intensity: 8-10/10 when they occur, currently 0/10   Frequency: seconds to minutes   Duration: seconds to minutes of the stabbing pain, migraine untreated hours, but treated within 1-2 hours   Aura: none   Associated symptoms: short lived stabbing pain left occipital pain, migraine has light/sound/smell sensitivities, nausea/vomiting   Other neurologic symptoms: difficulty memory, concentration, difficulty relaxation -unrelated to headache  No neck pain  Neck movements do not cause or maneuvers cause the left occipital pain  No autonomic features  No positional component   No tinnitus   Precipitating factors: foods, stress  Alleviating  "factors: sleep, darkness,  medications  Aggravating factors: exposure to noise,bending over,stress  Family history of headache: mother , maternal aunt   ER/UC visits: none recently   Caffeine:  "depens on how thirsty I am"  Sleep: snoring "    Medication Reconciliation:   Current Outpatient Medications   Medication Sig Dispense Refill    atomoxetine (STRATTERA) 40 MG capsule Take 40 mg by mouth once daily.      divalproex 500 MG Tb24 Take 500 mg by mouth once daily.      hydrOXYzine pamoate (VISTARIL) 50 MG Cap Take 50 mg by mouth 4 (four) times daily.      risperiDONE (RISPERDAL) 0.5 MG Tab Take by mouth.      sertraline (ZOLOFT) 100 MG tablet Take 100 mg by mouth once daily.      traZODone (DESYREL) 50 MG tablet Take 50 mg by mouth every evening.       No current facility-administered medications for this visit.     Review of patient's allergies indicates:  No Known Allergies    PMHx:  Past Medical History:   Diagnosis Date    Depression     Headache      No past surgical history on file.    Fhx:  No family history on file.    Shx:   Social History     Socioeconomic History    Marital status:    Tobacco Use    Smoking status: Never    Smokeless tobacco: Never   Substance and Sexual Activity    Alcohol use: No    Drug use: No    Sexual activity: Yes     Partners: Female           Labs:   Reviewed in chart     Imagin2022 MRI brain (report): FINDINGS:  Ventricles and sulci are normal in size for age without evidence of hydrocephalus.     The brain parenchyma appears within normal limits for the patient's age.   Diffusion-weighted images demonstrate no evidence of an acute infarct.   Susceptibility weighted images demonstrate no evidence of acute or chronic hemorrhage. No mass effect or midline shift.     Normal vascular flow voids are preserved.     Bone marrow signal intensity is normal. Small round cystic focus within the left mastoid antrum suggestive of a retention cyst.  The visualized portions " of the mastoids are unremarkable.  Orbits are unremarkable.     Impression:     No evidence of acute intracranial abnormality or other significant finding to explain the patient's symptoms..            Other testing:  Reviewed in chart     Note: I have independently reviewed any/all imaging/labs/tests and agree with the report (s) as documented.  Any discrepancies will be as noted/demarcated by free text.  SHANNEN OCHOA 2/17/2023                     ROS:   Review Of Systems (questions asked, positive or additions in BOLD)  Gen: Weight change, fatigue/malaise, pyrexia   HEENT: Tinnitus, headache,  blurred vision, eye pain, diplopia, photophobia,  nose bleeds, congestion, sore throat, jaw pain, scalp pain, neck stiffness   Card: Palpitations, CP   Pulm: SOB, cough   Vas: Easy bruising, easy bleeding   GI: N/V/D/C, incontinence, hematemesis, hematochezia    : incontinence, hematuria   Endocrine: Temp intolerance, polyuria, polydipsia   M/S: Neck pain, myalgia, back pain, joint pain, falls    Neuro: PER HPI   PSY: Memory loss, confusion, depression, anxiety, trouble in sleep, hallucinations          EXAM:   /81 (BP Location: Left arm, Patient Position: Sitting, BP Method: Large (Automatic))   Pulse 109    GEN:  NAD  HEENT: NC/AT, Frontalis was NTTP, temporalis was NTTP,  nares patent, dentition appropriate,  neck supple, trachea midline, Occiput TTP on the LEFT and trapezius NTTP  EXTREM:    no edema present.    NEURO:  Mental Status:  Awake, alert and appropriately oriented to time, place, and person.  Normal attention and concentration.  Speech is fluent and appropriate language function (I.e., comprehension).     Cranial Nerves:     Extraocular movements are intact and without nystagmus.  Visual fields are full to confrontation testing.   Facial movement is symmetric.  Facial sensation is intact.  Hearing is normal. Uvula in midline. FROM of neck in all (6) directions, shoulder shrug symmetrical. Tongue in  midline without fasiculation.     Motor:  antigravity in all limbs  Tremor/pronator drift not apparent.         DTR's:                                            R              L                  Knee jerk 1+ 1+        Coordination:  FTN-WNL.       Gait and Stance: Normal manner of stance and gait function testing.          This document has been electronically signed by Mr. Aneesh Dimas MPA, PA-C on 2/17/2023, I have personally typed this message using the EMR.       Dr Ej MD  was available during today's visit.     Personal Protective Equipment:    Personal Protective Equipment was used during this encounter including:  non latex gloves.          CC: Primary Doctor No

## 2024-04-12 ENCOUNTER — PATIENT MESSAGE (OUTPATIENT)
Dept: NEUROLOGY | Facility: CLINIC | Age: 39
End: 2024-04-12
Payer: MEDICAID

## 2024-05-30 ENCOUNTER — PATIENT MESSAGE (OUTPATIENT)
Dept: NEUROLOGY | Facility: CLINIC | Age: 39
End: 2024-05-30
Payer: MEDICAID

## 2024-05-30 NOTE — TELEPHONE ENCOUNTER
Called patient and notified that the insurance has denied the ONB for Monday. Patient inquired on why, opened up denial letter and his insurance stated that it is experimental and investigational. Informed that the appointment for Monday will have to be cancelled. Verbalized understanding. Appointment cancelled in the system.      Nosebleeds Normal Treatment: I explained this is common when taking isotretinoin. I recommended saline mist in each nostril multiple times a day. If this worsens they will contact us.

## 2024-07-14 ENCOUNTER — HOSPITAL ENCOUNTER (EMERGENCY)
Facility: HOSPITAL | Age: 39
Discharge: HOME | End: 2024-07-15
Attending: EMERGENCY MEDICINE
Payer: MEDICAID

## 2024-07-14 VITALS
RESPIRATION RATE: 18 BRPM | HEIGHT: 65 IN | HEART RATE: 74 BPM | BODY MASS INDEX: 40.98 KG/M2 | WEIGHT: 246 LBS | OXYGEN SATURATION: 99 % | DIASTOLIC BLOOD PRESSURE: 80 MMHG | SYSTOLIC BLOOD PRESSURE: 149 MMHG | TEMPERATURE: 98 F

## 2024-07-14 DIAGNOSIS — R13.10 DYSPHAGIA, UNSPECIFIED TYPE: Primary | ICD-10-CM

## 2024-07-14 LAB
ALBUMIN SERPL BCP-MCNC: 4 G/DL (ref 3.5–5.2)
ALP SERPL-CCNC: 113 U/L (ref 55–135)
ALT SERPL W/O P-5'-P-CCNC: 88 U/L (ref 10–44)
ANION GAP SERPL CALC-SCNC: 12 MMOL/L (ref 8–16)
ANISOCYTOSIS BLD QL SMEAR: SLIGHT
AST SERPL-CCNC: 49 U/L (ref 10–40)
BASOPHILS # BLD AUTO: 0.05 K/UL (ref 0–0.2)
BASOPHILS NFR BLD: 0.5 % (ref 0–1.9)
BILIRUB SERPL-MCNC: 0.3 MG/DL (ref 0.1–1)
BILIRUB UR QL STRIP: NEGATIVE
BUN SERPL-MCNC: 16 MG/DL (ref 6–20)
CALCIUM SERPL-MCNC: 9.6 MG/DL (ref 8.7–10.5)
CHLORIDE SERPL-SCNC: 105 MMOL/L (ref 95–110)
CLARITY UR: CLEAR
CO2 SERPL-SCNC: 21 MMOL/L (ref 23–29)
COLOR UR: YELLOW
CREAT SERPL-MCNC: 1 MG/DL (ref 0.5–1.4)
DIFFERENTIAL METHOD BLD: ABNORMAL
EOSINOPHIL # BLD AUTO: 0.1 K/UL (ref 0–0.5)
EOSINOPHIL NFR BLD: 0.7 % (ref 0–8)
ERYTHROCYTE [DISTWIDTH] IN BLOOD BY AUTOMATED COUNT: 12.8 % (ref 11.5–14.5)
EST. GFR  (NO RACE VARIABLE): >60 ML/MIN/1.73 M^2
GLUCOSE SERPL-MCNC: 93 MG/DL (ref 70–110)
GLUCOSE UR QL STRIP: NEGATIVE
HCT VFR BLD AUTO: 46.2 % (ref 40–54)
HGB BLD-MCNC: 15.4 G/DL (ref 14–18)
HGB UR QL STRIP: NEGATIVE
IMM GRANULOCYTES # BLD AUTO: 0.06 K/UL (ref 0–0.04)
IMM GRANULOCYTES NFR BLD AUTO: 0.6 % (ref 0–0.5)
KETONES UR QL STRIP: NEGATIVE
LEUKOCYTE ESTERASE UR QL STRIP: NEGATIVE
LIPASE SERPL-CCNC: 33 U/L (ref 4–60)
LYMPHOCYTES # BLD AUTO: 3.5 K/UL (ref 1–4.8)
LYMPHOCYTES NFR BLD: 35.4 % (ref 18–48)
MCH RBC QN AUTO: 28.3 PG (ref 27–31)
MCHC RBC AUTO-ENTMCNC: 33.3 G/DL (ref 32–36)
MCV RBC AUTO: 85 FL (ref 82–98)
MONOCYTES # BLD AUTO: 0.9 K/UL (ref 0.3–1)
MONOCYTES NFR BLD: 9 % (ref 4–15)
NEUTROPHILS # BLD AUTO: 5.3 K/UL (ref 1.8–7.7)
NEUTROPHILS NFR BLD: 53.8 % (ref 38–73)
NITRITE UR QL STRIP: NEGATIVE
NRBC BLD-RTO: 0 /100 WBC
PH UR STRIP: 6 [PH] (ref 5–8)
PLATELET # BLD AUTO: 240 K/UL (ref 150–450)
PLATELET BLD QL SMEAR: ABNORMAL
PMV BLD AUTO: 9.3 FL (ref 9.2–12.9)
POTASSIUM SERPL-SCNC: 4.4 MMOL/L (ref 3.5–5.1)
PROT SERPL-MCNC: 7.9 G/DL (ref 6–8.4)
PROT UR QL STRIP: NEGATIVE
RBC # BLD AUTO: 5.44 M/UL (ref 4.6–6.2)
SODIUM SERPL-SCNC: 138 MMOL/L (ref 136–145)
SP GR UR STRIP: 1.03 (ref 1–1.03)
URN SPEC COLLECT METH UR: NORMAL
UROBILINOGEN UR STRIP-ACNC: NEGATIVE EU/DL
WBC # BLD AUTO: 9.78 K/UL (ref 3.9–12.7)

## 2024-07-14 PROCEDURE — 96360 HYDRATION IV INFUSION INIT: CPT

## 2024-07-14 PROCEDURE — 99285 EMERGENCY DEPT VISIT HI MDM: CPT | Mod: 25

## 2024-07-14 PROCEDURE — 85025 COMPLETE CBC W/AUTO DIFF WBC: CPT | Performed by: EMERGENCY MEDICINE

## 2024-07-14 PROCEDURE — 25000003 PHARM REV CODE 250: Performed by: EMERGENCY MEDICINE

## 2024-07-14 PROCEDURE — 83690 ASSAY OF LIPASE: CPT | Performed by: EMERGENCY MEDICINE

## 2024-07-14 PROCEDURE — 81003 URINALYSIS AUTO W/O SCOPE: CPT | Performed by: EMERGENCY MEDICINE

## 2024-07-14 PROCEDURE — 80053 COMPREHEN METABOLIC PANEL: CPT | Performed by: EMERGENCY MEDICINE

## 2024-07-14 RX ORDER — ONDANSETRON HYDROCHLORIDE 2 MG/ML
4 INJECTION, SOLUTION INTRAVENOUS
Status: DISCONTINUED | OUTPATIENT
Start: 2024-07-14 | End: 2024-07-15 | Stop reason: HOSPADM

## 2024-07-14 RX ORDER — ONDANSETRON 4 MG/1
4 TABLET, ORALLY DISINTEGRATING ORAL EVERY 6 HOURS PRN
Qty: 30 TABLET | Refills: 0 | Status: SHIPPED | OUTPATIENT
Start: 2024-07-14

## 2024-07-14 RX ORDER — ACETAMINOPHEN 500 MG
1000 TABLET ORAL
Status: COMPLETED | OUTPATIENT
Start: 2024-07-14 | End: 2024-07-14

## 2024-07-14 RX ADMIN — ACETAMINOPHEN 1000 MG: 500 TABLET ORAL at 10:07

## 2024-07-14 RX ADMIN — SODIUM CHLORIDE 1000 ML: 9 INJECTION, SOLUTION INTRAVENOUS at 09:07

## 2024-07-14 NOTE — Clinical Note
"Cirilo Pichardo (Aaron) was seen and treated in our emergency department on 7/14/2024.  He may return to work on 07/16/2024.       If you have any questions or concerns, please don't hesitate to call.      Tia PALOMINO    " 14-Nov-2020 00:01

## 2024-07-14 NOTE — Clinical Note
"Cirilo Pichardo (Aaron) was seen and treated in our emergency department on 7/14/2024.  He may return to work on 07/16/2024.       If you have any questions or concerns, please don't hesitate to call.      Tia PALOMINO    "

## 2024-07-14 NOTE — Clinical Note
"Ciriol Pichardo (Aaron) was seen and treated in our emergency department on 7/14/2024.  He may return to work on 07/16/2024.       If you have any questions or concerns, please don't hesitate to call.      Tia PALOMINO    "

## 2024-07-15 NOTE — ED PROVIDER NOTES
"SCRIBE #1 NOTE: I, Norah Healy, am scribing for, and in the presence of, Morris Box Jr., MD. I have scribed the entire note.       History     Chief Complaint   Patient presents with    Dysphagia     Pt reports having trouble "keeping food down for the past 2-4 years." States, every time I eat I have trouble swallowing. Denies decreased appetite. -N/V/D.  Has an appointment with a gastrologist this coming Friday but says he can't wait that long. No SOB or distress in triage.      Review of patient's allergies indicates:  No Known Allergies      History of Present Illness     HPI    7/14/2024, 8:40 PM  History obtained from the patient      History of Present Illness: Cirilo Pichardo is a 38 y.o. male patient with a PMHx of psychiatric care, depression, bipolar disorder, and schizoaffective disorder who presents to the Emergency Department for evaluation of constant, mild dysphagia which onset several weeks PTA. The patient reports that he was eating pizza this afternoon and was unable to swallow it. He does note that he is still able to drink water and was able to eat chips and beef jerky after trying to eat pizza. He reports a Hx of GERD but is no longer taking medication for this. No mitigating or exacerbating factors reported. Patient denies any N/V, CP, SOB, dysuria, SI, HI, hallucinations, and all other sxs at this time. He does note that he has gained a significant amount of weight over the past 8 months. No prior Tx reported. No further complaints or concerns at this time.       Arrival mode: Personal vehicle    PCP: Ashlie Aguilar FNP        Past Medical History:  Past Medical History:   Diagnosis Date    Depression     Headache        Past Surgical History:  No past surgical history on file.      Family History:  No family history on file.    Social History:  Social History     Tobacco Use    Smoking status: Never    Smokeless tobacco: Never   Substance and Sexual Activity    Alcohol use: No    Drug use: No "    Sexual activity: Yes     Partners: Female        Review of Systems     Review of Systems   Constitutional:  Negative for fever.   HENT:  Positive for trouble swallowing. Negative for sore throat.    Respiratory:  Negative for shortness of breath.    Cardiovascular:  Negative for chest pain.   Gastrointestinal:  Negative for nausea and vomiting.   Genitourinary:  Negative for dysuria.   Musculoskeletal:  Negative for back pain.   Skin:  Negative for rash.   Neurological:  Negative for weakness.   Hematological:  Does not bruise/bleed easily.   Psychiatric/Behavioral:  Negative for hallucinations and suicidal ideas.         [-] homicidal ideas   All other systems reviewed and are negative.     Physical Exam     Initial Vitals [07/14/24 2010]   BP Pulse Resp Temp SpO2   (!) 164/89 96 17 98.1 °F (36.7 °C) 98 %      MAP       --          Physical Exam  Nursing Notes and Vital Signs Reviewed.  Constitutional: Patient is in no acute distress. Well-developed and well-nourished.  Head: Atraumatic. Normocephalic.  Eyes: PERRL. EOM intact. Conjunctivae are not pale. No scleral icterus.  ENT: Mucous membranes are moist. Oropharynx is clear and symmetric. Normal phonation. Able to manage oral secretions. Airway intact.   Neck: Supple. Full ROM. No lymphadenopathy.  Cardiovascular: Regular rate. Regular rhythm. No murmurs, rubs, or gallops. Distal pulses are 2+ and symmetric.  Pulmonary/Chest: No respiratory distress. Clear to auscultation bilaterally. No wheezing or rales.  Abdominal: Soft and non-distended.  There is no tenderness.  No rebound, guarding, or rigidity. Good bowel sounds.  Genitourinary: No CVA tenderness  Musculoskeletal: Moves all extremities. No obvious deformities. No edema. No calf tenderness.  Skin: Warm and dry.  Neurological:  Alert, awake, and appropriate.  Normal speech.  No acute focal neurological deficits are appreciated. GCS 15.   Psychiatric: Normal affect. Good eye contact. Appropriate in  "content.     ED Course   Procedures  ED Vital Signs:  Vitals:    07/14/24 2010 07/14/24 2210 07/14/24 2330   BP: (!) 164/89 (!) 158/79 (!) 149/80   Pulse: 96 82 74   Resp: 17 18 18   Temp: 98.1 °F (36.7 °C)     TempSrc: Oral     SpO2: 98% 99% 99%   Weight: 111.6 kg (246 lb)     Height: 5' 5" (1.651 m)         Abnormal Lab Results:  Labs Reviewed   CBC W/ AUTO DIFFERENTIAL - Abnormal       Result Value    WBC 9.78      RBC 5.44      Hemoglobin 15.4      Hematocrit 46.2      MCV 85      MCH 28.3      MCHC 33.3      RDW 12.8      Platelets 240      MPV 9.3      Immature Granulocytes 0.6 (*)     Gran # (ANC) 5.3      Immature Grans (Abs) 0.06 (*)     Lymph # 3.5      Mono # 0.9      Eos # 0.1      Baso # 0.05      nRBC 0      Gran % 53.8      Lymph % 35.4      Mono % 9.0      Eosinophil % 0.7      Basophil % 0.5      Platelet Estimate Appears normal      Aniso Slight      Differential Method Automated     COMPREHENSIVE METABOLIC PANEL - Abnormal    Sodium 138      Potassium 4.4      Chloride 105      CO2 21 (*)     Glucose 93      BUN 16      Creatinine 1.0      Calcium 9.6      Total Protein 7.9      Albumin 4.0      Total Bilirubin 0.3      Alkaline Phosphatase 113      AST 49 (*)     ALT 88 (*)     eGFR >60      Anion Gap 12     LIPASE    Lipase 33     URINALYSIS, REFLEX TO URINE CULTURE    Specimen UA Urine, Clean Catch      Color, UA Yellow      Appearance, UA Clear      pH, UA 6.0      Specific Gravity, UA 1.030      Protein, UA Negative      Glucose, UA Negative      Ketones, UA Negative      Bilirubin (UA) Negative      Occult Blood UA Negative      Nitrite, UA Negative      Urobilinogen, UA Negative      Leukocytes, UA Negative      Narrative:     Specimen Source->Urine        All Lab Results:  Results for orders placed or performed during the hospital encounter of 07/14/24   CBC W/ AUTO DIFFERENTIAL   Result Value Ref Range    WBC 9.78 3.90 - 12.70 K/uL    RBC 5.44 4.60 - 6.20 M/uL    Hemoglobin 15.4 14.0 - " 18.0 g/dL    Hematocrit 46.2 40.0 - 54.0 %    MCV 85 82 - 98 fL    MCH 28.3 27.0 - 31.0 pg    MCHC 33.3 32.0 - 36.0 g/dL    RDW 12.8 11.5 - 14.5 %    Platelets 240 150 - 450 K/uL    MPV 9.3 9.2 - 12.9 fL    Immature Granulocytes 0.6 (H) 0.0 - 0.5 %    Gran # (ANC) 5.3 1.8 - 7.7 K/uL    Immature Grans (Abs) 0.06 (H) 0.00 - 0.04 K/uL    Lymph # 3.5 1.0 - 4.8 K/uL    Mono # 0.9 0.3 - 1.0 K/uL    Eos # 0.1 0.0 - 0.5 K/uL    Baso # 0.05 0.00 - 0.20 K/uL    nRBC 0 0 /100 WBC    Gran % 53.8 38.0 - 73.0 %    Lymph % 35.4 18.0 - 48.0 %    Mono % 9.0 4.0 - 15.0 %    Eosinophil % 0.7 0.0 - 8.0 %    Basophil % 0.5 0.0 - 1.9 %    Platelet Estimate Appears normal     Aniso Slight     Differential Method Automated    Comp. Metabolic Panel   Result Value Ref Range    Sodium 138 136 - 145 mmol/L    Potassium 4.4 3.5 - 5.1 mmol/L    Chloride 105 95 - 110 mmol/L    CO2 21 (L) 23 - 29 mmol/L    Glucose 93 70 - 110 mg/dL    BUN 16 6 - 20 mg/dL    Creatinine 1.0 0.5 - 1.4 mg/dL    Calcium 9.6 8.7 - 10.5 mg/dL    Total Protein 7.9 6.0 - 8.4 g/dL    Albumin 4.0 3.5 - 5.2 g/dL    Total Bilirubin 0.3 0.1 - 1.0 mg/dL    Alkaline Phosphatase 113 55 - 135 U/L    AST 49 (H) 10 - 40 U/L    ALT 88 (H) 10 - 44 U/L    eGFR >60 >60 mL/min/1.73 m^2    Anion Gap 12 8 - 16 mmol/L   Lipase   Result Value Ref Range    Lipase 33 4 - 60 U/L   Urinalysis, Reflex to Urine Culture Urine, Clean Catch    Specimen: Urine   Result Value Ref Range    Specimen UA Urine, Clean Catch     Color, UA Yellow Yellow, Straw, Kalee    Appearance, UA Clear Clear    pH, UA 6.0 5.0 - 8.0    Specific Gravity, UA 1.030 1.005 - 1.030    Protein, UA Negative Negative    Glucose, UA Negative Negative    Ketones, UA Negative Negative    Bilirubin (UA) Negative Negative    Occult Blood UA Negative Negative    Nitrite, UA Negative Negative    Urobilinogen, UA Negative <2.0 EU/dL    Leukocytes, UA Negative Negative         Imaging Results:  Imaging Results              CT Soft Tissue  Neck WO Contrast (Final result)  Result time 07/14/24 21:38:36      Final result by Mary Hammer MD (07/14/24 21:38:36)                   Impression:      No pathology identified      Electronically signed by: Mary Hammer  Date:    07/14/2024  Time:    21:38               Narrative:    EXAMINATION:  CT SOFT TISSUE NECK WITHOUT CONTRAST    CLINICAL HISTORY:  Neck mass, nonpulsatile;dysphagia;    TECHNIQUE:  Low dose axial images as well as sagittal and coronal reconstructions were performed from the skull base to the clavicles following the intravenous administration of 75 mL of Omnipaque 350.    COMPARISON:  None    FINDINGS:  No mass lesion or sizable fluid collection identified.  Epiglottis and prevertebral soft tissues unremarkable.                                                The Emergency Provider reviewed the vital signs and test results, which are outlined above.     ED Discussion     11:21 PM: Reassessed pt at this time.  Pt states his condition has improved at this time. The patient is able to tolerate PO and ambulate without assistance. Discussed with pt all pertinent ED information and results. Discussed pt dx and plan of tx. Gave pt all f/u and return to the ED instructions. All questions and concerns were addressed at this time. Pt expresses understanding of information and instructions, and is comfortable with plan to discharge. Pt is stable for discharge.    I discussed with patient and/or family/caretaker that evaluation in the ED does not suggest any emergent or life threatening medical conditions requiring immediate intervention beyond what was provided in the ED, and I believe patient is safe for discharge.  Regardless, an unremarkable evaluation in the ED does not preclude the development or presence of a serious of life threatening condition. As such, patient was instructed to return immediately for any worsening or change in current symptoms.         Medical Decision  Making  Amount and/or Complexity of Data Reviewed  Labs: ordered. Decision-making details documented in ED Course.  Radiology: ordered and independent interpretation performed. Decision-making details documented in ED Course.    Risk  OTC drugs.  Prescription drug management.  Parenteral controlled substances.  Risk Details: OTC drugs, prescription drugs and controlled substances considered.  Due to patient's symptoms improving and pain controlled pain medications ordered appropriately.  Differential diagnosis;  Gastroenteritis, Bowel obstruction, Colitis, Diverticulitis, Cholecystitis, Appendicitis, Perforated bowel, Herniation, Infectious etiology, UTI, Pyelonephritis,  Biliary obstruction, kidney stone                   ED Medication(s):  Medications   sodium chloride 0.9% bolus 1,000 mL 1,000 mL (0 mLs Intravenous Stopped 7/14/24 2231)   acetaminophen tablet 1,000 mg (1,000 mg Oral Given 7/14/24 2232)       Discharge Medication List as of 7/14/2024 11:58 PM        START taking these medications    Details   ondansetron (ZOFRAN-ODT) 4 MG TbDL Take 1 tablet (4 mg total) by mouth every 6 (six) hours as needed., Starting Sun 7/14/2024, Print              Follow-up Information       Ashlie Aguilar FNP. Schedule an appointment as soon as possible for a visit in 1 week.    Specialty: Family Medicine  Contact information:  [address] [address]  [address] [place] [address]  [phone]               [facility]. Schedule an appointment as soon as possible for a visit in 1 week.    Specialty: Gastroenterology  Contact information:  [address] [facility] [address] [phone]  Additional information:  Please park on the [address] side and use the Clinic entrance. Check in on the 4th floor, to the left.             [facility] - Emergency Dept..    Specialty: Emergency Medicine  Why: As needed, If symptoms worsen  Contact information:  [address]  [place]  [place] [address]  [phone]                               Scribe Attestation:   Scribe #1: I performed the above scribed service and the documentation accurately describes the services I performed. I attest to the accuracy of the note.     Attending:   Physician Attestation Statement for Scribe #1: I, Morris Box Jr., MD, personally performed the services described in this documentation, as scribed by Norah Healy, in my presence, and it is both accurate and complete.           Clinical Impression       ICD-10-CM ICD-9-CM   1. Dysphagia, unspecified type  R13.10 787.20       Disposition:   Disposition: Discharged  Condition: Stable         Morris Box Jr., MD  07/21/24 5345

## 2024-07-15 NOTE — ED NOTES
"Pt reports has an appt this Friday for s/s, states "I just don't think I'm going to make it til then because I'm unable to eat anything". Pt also reports s/s have been going on for approx 4yrs & reports eating beef jerky today w/ no difficulty. Patent airway noted. Respirations even/unlabored.  "

## 2024-10-27 ENCOUNTER — HOSPITAL ENCOUNTER (EMERGENCY)
Facility: HOSPITAL | Age: 39
Discharge: HOME | End: 2024-10-27
Attending: FAMILY MEDICINE
Payer: MEDICAID

## 2024-10-27 VITALS
OXYGEN SATURATION: 96 % | HEART RATE: 117 BPM | RESPIRATION RATE: 20 BRPM | BODY MASS INDEX: 39.65 KG/M2 | TEMPERATURE: 98 F | SYSTOLIC BLOOD PRESSURE: 134 MMHG | WEIGHT: 238 LBS | DIASTOLIC BLOOD PRESSURE: 76 MMHG | HEIGHT: 65 IN

## 2024-10-27 DIAGNOSIS — T14.8XXA WOUND INFECTION: ICD-10-CM

## 2024-10-27 DIAGNOSIS — L08.9 WOUND INFECTION: ICD-10-CM
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
## 2024-10-27 PROCEDURE — 99283 EMERGENCY DEPT VISIT LOW MDM: CPT | Mod: 25

## 2024-10-27 RX ORDER — AMOXICILLIN AND CLAVULANATE POTASSIUM 875; 125 MG/1; MG/1
1 TABLET, FILM COATED ORAL 2 TIMES DAILY
Qty: 14 TABLET | Refills: 0 | Status: SHIPPED | OUTPATIENT
Start: 2024-10-27

## 2024-10-28 ENCOUNTER — HOSPITAL ENCOUNTER (EMERGENCY)
Facility: HOSPITAL | Age: 39
Discharge: HOME | End: 2024-10-28
Attending: EMERGENCY MEDICINE
Payer: MEDICAID

## 2024-10-28 VITALS
DIASTOLIC BLOOD PRESSURE: 82 MMHG | OXYGEN SATURATION: 99 % | TEMPERATURE: 98 F | WEIGHT: 240.31 LBS | SYSTOLIC BLOOD PRESSURE: 132 MMHG | BODY MASS INDEX: 40.04 KG/M2 | HEIGHT: 65 IN | HEART RATE: 106 BPM | RESPIRATION RATE: 16 BRPM

## 2024-10-28 DIAGNOSIS — L03.119 CELLULITIS OF HAND: Primary | ICD-10-CM

## 2024-10-28 LAB
ALBUMIN SERPL BCP-MCNC: 3.6 G/DL (ref 3.5–5.2)
ALP SERPL-CCNC: 110 U/L (ref 40–150)
ALT SERPL W/O P-5'-P-CCNC: 60 U/L (ref 10–44)
ANION GAP SERPL CALC-SCNC: 8 MMOL/L (ref 8–16)
AST SERPL-CCNC: 29 U/L (ref 10–40)
BASOPHILS # BLD AUTO: 0.04 K/UL (ref 0–0.2)
BASOPHILS NFR BLD: 0.4 % (ref 0–1.9)
BILIRUB SERPL-MCNC: 0.3 MG/DL (ref 0.1–1)
BUN SERPL-MCNC: 10 MG/DL (ref 6–20)
CALCIUM SERPL-MCNC: 9.2 MG/DL (ref 8.7–10.5)
CHLORIDE SERPL-SCNC: 104 MMOL/L (ref 95–110)
CO2 SERPL-SCNC: 27 MMOL/L (ref 23–29)
CREAT SERPL-MCNC: 0.9 MG/DL (ref 0.5–1.4)
DIFFERENTIAL METHOD BLD: ABNORMAL
EOSINOPHIL # BLD AUTO: 0 K/UL (ref 0–0.5)
EOSINOPHIL NFR BLD: 0.3 % (ref 0–8)
ERYTHROCYTE [DISTWIDTH] IN BLOOD BY AUTOMATED COUNT: 12.1 % (ref 11.5–14.5)
EST. GFR  (NO RACE VARIABLE): >60 ML/MIN/1.73 M^2
GLUCOSE SERPL-MCNC: 86 MG/DL (ref 70–110)
HCT VFR BLD AUTO: 42.4 % (ref 40–54)
HGB BLD-MCNC: 14.4 G/DL (ref 14–18)
IMM GRANULOCYTES # BLD AUTO: 0.08 K/UL (ref 0–0.04)
IMM GRANULOCYTES NFR BLD AUTO: 0.8 % (ref 0–0.5)
LACTATE SERPL-SCNC: 0.7 MMOL/L (ref 0.5–2.2)
LYMPHOCYTES # BLD AUTO: 1.9 K/UL (ref 1–4.8)
LYMPHOCYTES NFR BLD: 19.8 % (ref 18–48)
MCH RBC QN AUTO: 28.2 PG (ref 27–31)
MCHC RBC AUTO-ENTMCNC: 34 G/DL (ref 32–36)
MCV RBC AUTO: 83 FL (ref 82–98)
MONOCYTES # BLD AUTO: 1.2 K/UL (ref 0.3–1)
MONOCYTES NFR BLD: 11.9 % (ref 4–15)
NEUTROPHILS # BLD AUTO: 6.5 K/UL (ref 1.8–7.7)
NEUTROPHILS NFR BLD: 66.8 % (ref 38–73)
NRBC BLD-RTO: 0 /100 WBC
PLATELET # BLD AUTO: 192 K/UL (ref 150–450)
PMV BLD AUTO: 9 FL (ref 9.2–12.9)
POTASSIUM SERPL-SCNC: 4.3 MMOL/L (ref 3.5–5.1)
PROCALCITONIN SERPL IA-MCNC: 0.03 NG/ML
PROT SERPL-MCNC: 7.2 G/DL (ref 6–8.4)
RBC # BLD AUTO: 5.11 M/UL (ref 4.6–6.2)
SODIUM SERPL-SCNC: 139 MMOL/L (ref 136–145)
WBC # BLD AUTO: 9.74 K/UL (ref 3.9–12.7)

## 2024-10-28 PROCEDURE — 87040 BLOOD CULTURE FOR BACTERIA: CPT | Mod: 59 | Performed by: REGISTERED NURSE

## 2024-10-28 PROCEDURE — 84145 PROCALCITONIN (PCT): CPT | Performed by: REGISTERED NURSE

## 2024-10-28 PROCEDURE — 25000003 PHARM REV CODE 250: Performed by: REGISTERED NURSE

## 2024-10-28 PROCEDURE — 96365 THER/PROPH/DIAG IV INF INIT: CPT

## 2024-10-28 PROCEDURE — 99284 EMERGENCY DEPT VISIT MOD MDM: CPT | Mod: 25

## 2024-10-28 PROCEDURE — 85025 COMPLETE CBC W/AUTO DIFF WBC: CPT | Performed by: REGISTERED NURSE

## 2024-10-28 PROCEDURE — 80053 COMPREHEN METABOLIC PANEL: CPT | Performed by: REGISTERED NURSE

## 2024-10-28 PROCEDURE — 83605 ASSAY OF LACTIC ACID: CPT | Performed by: REGISTERED NURSE

## 2024-10-28 RX ORDER — DOXYCYCLINE 100 MG/1
100 CAPSULE ORAL 2 TIMES DAILY
Qty: 14 CAPSULE | Refills: 0 | Status: SHIPPED | OUTPATIENT
Start: 2024-10-28 | End: 2024-11-04

## 2024-10-28 RX ADMIN — DOXYCYCLINE 100 MG: 100 INJECTION, POWDER, LYOPHILIZED, FOR SOLUTION INTRAVENOUS at 12:10

## 2024-10-28 NOTE — ED PROVIDER NOTES
Encounter Date: 10/28/2024       History     Chief Complaint   Patient presents with    Wound Infection     Pt states he was bit by a cat yesterday and was given antibiotics yesterday but woke up today with with his had swollen and hurting.      38-year-old male presents emergency department with complaints of left hand swelling and pain.  Patient was bitten by a cat yesterday.  Patient was seen and treated here.  Patient currently taking Augmentin.  Patient denies any fever or chills.    The history is provided by the patient.     Review of patient's allergies indicates:  No Known Allergies  Past Medical History:   Diagnosis Date    Depression     Headache     Schizo-affective disorder      No past surgical history on file.  No family history on file.  Social History     Tobacco Use    Smoking status: Never    Smokeless tobacco: Never   Substance Use Topics    Alcohol use: No    Drug use: No     Review of Systems   Constitutional:  Negative for fever.   HENT:  Negative for sore throat.    Respiratory:  Negative for shortness of breath.    Cardiovascular:  Negative for chest pain.   Gastrointestinal:  Negative for nausea.   Genitourinary:  Negative for dysuria.   Musculoskeletal:  Negative for back pain.        +left hand swelling   Skin:  Negative for rash.   Neurological:  Negative for weakness.   Hematological:  Does not bruise/bleed easily.   All other systems reviewed and are negative.      Physical Exam     Initial Vitals [10/28/24 1041]   BP Pulse Resp Temp SpO2   132/82 106 16 98 °F (36.7 °C) 99 %      MAP       --         Physical Exam    Constitutional: He appears well-developed and well-nourished. No distress.   HENT:   Head: Normocephalic and atraumatic.   Nose: Nose normal. Mouth/Throat: Uvula is midline and oropharynx is clear and moist.   Eyes: Conjunctivae and EOM are normal. Pupils are equal, round, and reactive to light.   Neck: Neck supple.   Normal range of motion.  Cardiovascular:  Normal rate  and regular rhythm.           Pulmonary/Chest: Effort normal and breath sounds normal. No respiratory distress. He has no decreased breath sounds. He has no wheezes. He has no rales.   Abdominal: Abdomen is soft. Bowel sounds are normal. There is no abdominal tenderness.   Musculoskeletal:         General: Normal range of motion.      Left hand: Swelling and tenderness present.      Cervical back: Normal range of motion and neck supple.      Comments: No swelling erythema noted to the left hand, 2 puncture wounds noted, no drainage, full range of motion, capillary refill less than 3 seconds     Neurological: He is alert and oriented to person, place, and time. He has normal strength. GCS eye subscore is 4. GCS verbal subscore is 5. GCS motor subscore is 6.   Skin: Skin is warm and dry. Capillary refill takes less than 2 seconds. No rash noted.   Psychiatric: He has a normal mood and affect. His speech is normal and behavior is normal.         ED Course   Procedures  Labs Reviewed   CBC W/ AUTO DIFFERENTIAL - Abnormal       Result Value    WBC 9.74      RBC 5.11      Hemoglobin 14.4      Hematocrit 42.4      MCV 83      MCH 28.2      MCHC 34.0      RDW 12.1      Platelets 192      MPV 9.0 (*)     Immature Granulocytes 0.8 (*)     Gran # (ANC) 6.5      Immature Grans (Abs) 0.08 (*)     Lymph # 1.9      Mono # 1.2 (*)     Eos # 0.0      Baso # 0.04      nRBC 0      Gran % 66.8      Lymph % 19.8      Mono % 11.9      Eosinophil % 0.3      Basophil % 0.4      Differential Method Automated     COMPREHENSIVE METABOLIC PANEL - Abnormal    Sodium 139      Potassium 4.3      Chloride 104      CO2 27      Glucose 86      BUN 10      Creatinine 0.9      Calcium 9.2      Total Protein 7.2      Albumin 3.6      Total Bilirubin 0.3      Alkaline Phosphatase 110      AST 29      ALT 60 (*)     eGFR >60      Anion Gap 8     CULTURE, BLOOD   CULTURE, BLOOD   LACTIC ACID, PLASMA    Lactate (Lactic Acid) 0.7     PROCALCITONIN     Procalcitonin 0.03     LACTIC ACID, PLASMA          Imaging Results    None          Medications   doxycycline 100 mg in D5W 100 mL IVPB (MB+) (100 mg Intravenous New Bag 10/28/24 1206)     Medical Decision Making  Amount and/or Complexity of Data Reviewed  Labs: ordered.     Details: Unremarkable  Discussion of management or test interpretation with external provider(s): Patient has not fever, moderate swelling noted to the left hand.  Labs are unremarkable today.  Will add on doxycycline and continue taking Augmentin at this time.  Follow up with primary care.  Patient should return the emergency department for any worsening swelling or pain.    Risk  Prescription drug management.  Risk Details: I discussed with patient and/or family/caretaker that evaluation in the ED does not suggest any emergent or life threatening medical conditions requiring immediate intervention beyond what was provided in the ED, and I believe patient is safe for discharge.  Regardless, an unremarkable evaluation in the ED does not preclude the development or presence of a serious of life threatening condition. As such, patient was instructed to return immediately for any worsening or change in current symptoms.                                        Clinical Impression:  Final diagnoses:  [L03.119] Cellulitis of hand (Primary)          ED Disposition Condition    Discharge Stable          ED Prescriptions       Medication Sig Dispense Start Date End Date Auth. Provider    doxycycline (VIBRAMYCIN) 100 MG Cap Take 1 capsule (100 mg total) by mouth 2 (two) times daily. for 7 days 14 capsule 10/28/2024 11/4/2024 Clark Juarez Jr., FNP          Follow-up Information       Follow up With Specialties Details Why Contact Info    Ashlie Aguilar FNP Family Medicine In 1 week  [address] [facility] [address]  [address]  [facility] [address]  [phone]               Clark Juarez Jr., FNP  10/28/24 4473

## 2024-11-02 LAB
BACTERIA BLD CULT: NORMAL
BACTERIA BLD CULT: NORMAL

## 2024-12-05 ENCOUNTER — OFFICE VISIT (OUTPATIENT)
Dept: PSYCHIATRY | Facility: CLINIC | Age: 39
End: 2024-12-05
Payer: MEDICAID

## 2024-12-05 DIAGNOSIS — F41.9 ANXIETY: ICD-10-CM

## 2024-12-05 DIAGNOSIS — F25.0 SCHIZOAFFECTIVE DISORDER, BIPOLAR TYPE: Primary | ICD-10-CM

## 2024-12-05 DIAGNOSIS — G43.909 EPISODIC MIGRAINE: ICD-10-CM

## 2024-12-05 DIAGNOSIS — F90.8 ADHD, ADULT RESIDUAL TYPE: ICD-10-CM

## 2024-12-05 PROCEDURE — 99212 OFFICE O/P EST SF 10 MIN: CPT | Mod: PBBFAC | Performed by: SOCIAL WORKER

## 2024-12-05 PROCEDURE — 99999 PR PBB SHADOW E&M-EST. PATIENT-LVL II: CPT | Mod: PBBFAC,AJ,HB, | Performed by: SOCIAL WORKER

## 2024-12-05 PROCEDURE — 90791 PSYCH DIAGNOSTIC EVALUATION: CPT | Mod: AJ,HB,, | Performed by: SOCIAL WORKER
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
## 2024-12-09 ENCOUNTER — HOSPITAL ENCOUNTER (EMERGENCY)
Facility: HOSPITAL | Age: 39
Discharge: HOME | End: 2024-12-09
Attending: EMERGENCY MEDICINE
Payer: MEDICAID

## 2024-12-09 VITALS
WEIGHT: 237.19 LBS | TEMPERATURE: 98 F | RESPIRATION RATE: 16 BRPM | SYSTOLIC BLOOD PRESSURE: 148 MMHG | DIASTOLIC BLOOD PRESSURE: 87 MMHG | OXYGEN SATURATION: 98 % | HEART RATE: 110 BPM | BODY MASS INDEX: 39.47 KG/M2

## 2024-12-09 DIAGNOSIS — Z76.0 MEDICATION REFILL: Primary | ICD-10-CM

## 2024-12-09 PROCEDURE — 99284 EMERGENCY DEPT VISIT MOD MDM: CPT

## 2024-12-09 RX ORDER — DIVALPROEX SODIUM 500 MG/1
TABLET, FILM COATED, EXTENDED RELEASE ORAL
Qty: 90 TABLET | Refills: 0 | Status: SHIPPED | OUTPATIENT
Start: 2024-12-09

## 2024-12-09 RX ORDER — RISPERIDONE 2 MG/1
TABLET ORAL
Qty: 30 TABLET | Refills: 0 | Status: SHIPPED | OUTPATIENT
Start: 2024-12-09

## 2024-12-09 NOTE — ED PROVIDER NOTES
Encounter Date: 12/9/2024       History     Chief Complaint   Patient presents with    Medication Refill     Pt. Reports that he is out of some of his medications. He is trying to get with a psych to have his meds refilled and has had no luck.      39 year old male here for refill of Depakote and Risperdal.  No other complaints.         Review of patient's allergies indicates:  No Known Allergies  Past Medical History:   Diagnosis Date    Depression     Headache     Schizo-affective disorder      No past surgical history on file.  No family history on file.  Social History     Tobacco Use    Smoking status: Never    Smokeless tobacco: Never   Substance Use Topics    Alcohol use: No    Drug use: No     Review of Systems   Constitutional:  Negative for fever.   HENT:  Negative for sore throat.    Respiratory:  Negative for shortness of breath.    Cardiovascular:  Negative for chest pain.   Gastrointestinal:  Negative for nausea.   Genitourinary:  Negative for dysuria.   Musculoskeletal:  Negative for back pain.   Skin:  Negative for rash.   Neurological:  Negative for weakness.   Hematological:  Does not bruise/bleed easily.       Physical Exam     Initial Vitals [12/09/24 0901]   BP Pulse Resp Temp SpO2   (!) 148/87 110 16 98 °F (36.7 °C) 98 %      MAP       --         Physical Exam    Nursing note and vitals reviewed.  Constitutional: He appears well-developed and well-nourished.   HENT:   Head: Normocephalic and atraumatic.   Eyes: Conjunctivae are normal. Pupils are equal, round, and reactive to light.   Neck: Neck supple.   Normal range of motion.  Cardiovascular:  Normal rate, regular rhythm, normal heart sounds and intact distal pulses.           Pulmonary/Chest: Breath sounds normal.   Abdominal: Abdomen is soft. There is no rebound and no guarding.   Musculoskeletal:         General: Normal range of motion.      Cervical back: Normal range of motion and neck supple.     Neurological: He is alert.   Skin: Skin  is warm and dry.   Psychiatric: He has a normal mood and affect. His behavior is normal. Thought content normal.         ED Course   Procedures  Labs Reviewed - No data to display       Imaging Results    None          Medications - No data to display  Medical Decision Making                                    Clinical Impression:  Final diagnoses:  [Z76.0] Medication refill (Primary)          ED Disposition Condition    Discharge Stable          ED Prescriptions       Medication Sig Dispense Start Date End Date Auth. Provider    divalproex ER (DEPAKOTE ER) 500 MG Tb24 24 hr tablet 1 tablet in the morning and 2 tablets every evening 90 tablet 12/9/2024 -- Angelito Mueller NP    risperiDONE (RISPERDAL) 2 MG tablet 1 tablet nightly 30 tablet 12/9/2024 -- Angelito Mueller NP          Follow-up Information       Follow up With Specialties Details Why Contact Info    Ashlie Aguilar FNP Family Medicine Schedule an appointment as soon as possible for a visit in 2 days  [address] [facility]   [address] [place] [id number]  [phone]               Angelito Mueller NP  12/09/24 0990

## 2025-01-06 ENCOUNTER — HOSPITAL ENCOUNTER (EMERGENCY)
Facility: HOSPITAL | Age: 40
Discharge: HOME | End: 2025-01-06
Attending: EMERGENCY MEDICINE
Payer: MEDICAID

## 2025-01-06 VITALS
HEART RATE: 105 BPM | SYSTOLIC BLOOD PRESSURE: 164 MMHG | OXYGEN SATURATION: 95 % | TEMPERATURE: 98 F | DIASTOLIC BLOOD PRESSURE: 82 MMHG | RESPIRATION RATE: 18 BRPM

## 2025-01-06 DIAGNOSIS — Z76.0 MEDICATION REFILL: Primary | ICD-10-CM

## 2025-01-06 PROCEDURE — 99284 EMERGENCY DEPT VISIT MOD MDM: CPT

## 2025-01-06 RX ORDER — DIVALPROEX SODIUM 500 MG/1
TABLET, FILM COATED, EXTENDED RELEASE ORAL
Qty: 90 TABLET | Refills: 0 | Status: SHIPPED | OUTPATIENT
Start: 2025-01-06

## 2025-01-06 RX ORDER — HYDROXYZINE PAMOATE 50 MG/1
50 CAPSULE ORAL EVERY 6 HOURS PRN
Qty: 90 CAPSULE | Refills: 0 | Status: SHIPPED | OUTPATIENT
Start: 2025-01-06

## 2025-01-06 RX ORDER — SERTRALINE HYDROCHLORIDE 100 MG/1
100 TABLET, FILM COATED ORAL DAILY
Qty: 30 TABLET | Refills: 0 | Status: SHIPPED | OUTPATIENT
Start: 2025-01-06 | End: 2025-02-05

## 2025-01-06 RX ORDER — ATOMOXETINE 40 MG/1
40 CAPSULE ORAL DAILY
Qty: 30 CAPSULE | Refills: 0 | Status: SHIPPED | OUTPATIENT
Start: 2025-01-06 | End: 2025-02-05

## 2025-01-06 RX ORDER — RISPERIDONE 2 MG/1
TABLET ORAL
Qty: 30 TABLET | Refills: 0 | Status: SHIPPED | OUTPATIENT
Start: 2025-01-06

## 2025-01-06 RX ORDER — RISPERIDONE 1 MG/1
1 TABLET ORAL 2 TIMES DAILY
Qty: 90 TABLET | Refills: 0 | Status: SHIPPED | OUTPATIENT
Start: 2025-01-06 | End: 2026-01-06

## 2025-01-06 NOTE — ED PROVIDER NOTES
Encounter Date: 1/6/2025       History     Chief Complaint   Patient presents with    Medication Refill     Pt coming in for a medication refill     39-year-old male presents emergency department requesting medication refill.  History of anxiety and bipolar.  He denies any complaints at this time.  No suicidal or homicidal thoughts, no hallucinations.     The history is provided by the patient.     Review of patient's allergies indicates:  No Known Allergies  Past Medical History:   Diagnosis Date    Depression     Headache     Schizo-affective disorder      No past surgical history on file.  No family history on file.  Social History     Tobacco Use    Smoking status: Never    Smokeless tobacco: Never   Substance Use Topics    Alcohol use: No    Drug use: No     Review of Systems   Constitutional:  Negative for fever.        +medication refill   HENT:  Negative for sore throat.    Respiratory:  Negative for shortness of breath.    Cardiovascular:  Negative for chest pain.   Gastrointestinal:  Negative for nausea.   Genitourinary:  Negative for dysuria.   Musculoskeletal:  Negative for back pain.   Skin:  Negative for rash.   Neurological:  Negative for weakness.   Hematological:  Does not bruise/bleed easily.   All other systems reviewed and are negative.      Physical Exam     Initial Vitals [01/06/25 1117]   BP Pulse Resp Temp SpO2   (!) 164/82 105 18 97.9 °F (36.6 °C) 95 %      MAP       --         Physical Exam    Constitutional: He appears well-developed and well-nourished. No distress.   HENT:   Head: Normocephalic and atraumatic.   Nose: Nose normal. Mouth/Throat: Uvula is midline and oropharynx is clear and moist.   Eyes: Conjunctivae and EOM are normal. Pupils are equal, round, and reactive to light.   Neck: Neck supple.   Normal range of motion.  Cardiovascular:  Normal rate and regular rhythm.           Pulmonary/Chest: Effort normal and breath sounds normal. No respiratory distress. He has no decreased  breath sounds. He has no wheezes. He has no rales.   Abdominal: Abdomen is soft. Bowel sounds are normal. There is no abdominal tenderness.   Musculoskeletal:         General: Normal range of motion.      Cervical back: Normal range of motion and neck supple.     Neurological: He is alert and oriented to person, place, and time. He has normal strength. GCS eye subscore is 4. GCS verbal subscore is 5. GCS motor subscore is 6.   Skin: Skin is warm and dry. Capillary refill takes less than 2 seconds. No rash noted.   Psychiatric: He has a normal mood and affect. His speech is normal and behavior is normal.         ED Course   Procedures  Labs Reviewed - No data to display       Imaging Results    None          Medications - No data to display  Medical Decision Making  Amount and/or Complexity of Data Reviewed  Discussion of management or test interpretation with external provider(s): Sent prescriptions over to [facility].  Advised patient to follow up with his primary care physician.    Risk  Prescription drug management.                                      Clinical Impression:  Final diagnoses:  [Z76.0] Medication refill (Primary)          ED Disposition Condition    Discharge Stable          ED Prescriptions       Medication Sig Dispense Start Date End Date Auth. Provider    atomoxetine (STRATTERA) 40 MG capsule Take 1 capsule (40 mg total) by mouth once daily. 30 capsule 1/6/2025 2/5/2025 Clark Juarez Jr., CARRIE    divalproex ER (DEPAKOTE ER) 500 MG Tb24 24 hr tablet 1 tablet in the morning and 2 tablets every evening 90 tablet 1/6/2025 -- Clark Juarez Jr., CARRIE    risperiDONE (RISPERDAL) 2 MG tablet 1 tablet nightly 30 tablet 1/6/2025 -- Clark Juarez Jr., CARRIE    sertraline (ZOLOFT) 100 MG tablet Take 1 tablet (100 mg total) by mouth once daily. 30 tablet 1/6/2025 2/5/2025 Clark Juarez Jr., CARRIE    risperiDONE (RISPERDAL) 1 MG tablet Take 1 tablet (1 mg total) by mouth 2 (two) times daily.  Take 1 tablet by mouth every morning and take 2 tablets by mouth every evening 90 tablet 1/6/2025 1/6/2026 Clark Juarez Jr., CARRIE    hydrOXYzine pamoate (VISTARIL) 50 MG Cap Take 1 capsule (50 mg total) by mouth every 6 (six) hours as needed (anxiety). 90 capsule 1/6/2025 -- Clark Juarez Jr., FNP          Follow-up Information       Follow up With Specialties Details Why Contact Info    Ashlie Aguilar FNP Family Medicine In 1 week  [address] [address]  [address] [place] [address]  [phone]               Clark Juarez Jr., FNP  01/06/25 5680

## 2025-02-13 ENCOUNTER — HOSPITAL ENCOUNTER (EMERGENCY)
Facility: HOSPITAL | Age: 40
Discharge: HOME | End: 2025-02-13
Attending: EMERGENCY MEDICINE
Payer: MEDICAID

## 2025-02-13 VITALS
HEART RATE: 84 BPM | TEMPERATURE: 99 F | SYSTOLIC BLOOD PRESSURE: 148 MMHG | WEIGHT: 235.88 LBS | DIASTOLIC BLOOD PRESSURE: 75 MMHG | OXYGEN SATURATION: 97 % | HEIGHT: 65 IN | BODY MASS INDEX: 39.3 KG/M2 | RESPIRATION RATE: 18 BRPM

## 2025-02-13 DIAGNOSIS — Z76.0 MEDICATION REFILL: Primary | ICD-10-CM

## 2025-02-13 PROCEDURE — 99284 EMERGENCY DEPT VISIT MOD MDM: CPT

## 2025-02-13 RX ORDER — RISPERIDONE 2 MG/1
TABLET ORAL
Qty: 30 TABLET | Refills: 0 | Status: SHIPPED | OUTPATIENT
Start: 2025-02-13

## 2025-02-13 RX ORDER — SERTRALINE HYDROCHLORIDE 100 MG/1
100 TABLET, FILM COATED ORAL DAILY
Qty: 30 TABLET | Refills: 0 | Status: SHIPPED | OUTPATIENT
Start: 2025-02-13 | End: 2025-03-15

## 2025-02-13 RX ORDER — DIVALPROEX SODIUM 500 MG/1
TABLET, FILM COATED, EXTENDED RELEASE ORAL
Qty: 90 TABLET | Refills: 0 | Status: SHIPPED | OUTPATIENT
Start: 2025-02-13

## 2025-03-30 ENCOUNTER — HOSPITAL ENCOUNTER (EMERGENCY)
Facility: HOSPITAL | Age: 40
Discharge: HOME | End: 2025-03-30
Attending: EMERGENCY MEDICINE
Payer: MEDICAID

## 2025-03-30 VITALS
BODY MASS INDEX: 41.22 KG/M2 | WEIGHT: 247.38 LBS | HEART RATE: 90 BPM | TEMPERATURE: 98 F | DIASTOLIC BLOOD PRESSURE: 71 MMHG | SYSTOLIC BLOOD PRESSURE: 137 MMHG | RESPIRATION RATE: 12 BRPM | OXYGEN SATURATION: 96 % | HEIGHT: 65 IN

## 2025-03-30 DIAGNOSIS — R07.9 CHEST PAIN: ICD-10-CM

## 2025-03-30 DIAGNOSIS — I10 HYPERTENSION, UNSPECIFIED TYPE: Primary | ICD-10-CM

## 2025-03-30 LAB
ABSOLUTE EOSINOPHIL (OHS): 0.08 K/UL
ABSOLUTE MONOCYTE (OHS): 0.58 K/UL (ref 0.3–1)
ABSOLUTE NEUTROPHIL COUNT (OHS): 2.79 K/UL (ref 1.8–7.7)
ALBUMIN SERPL BCP-MCNC: 3.6 G/DL (ref 3.5–5.2)
ALP SERPL-CCNC: 101 UNIT/L (ref 40–150)
ALT SERPL W/O P-5'-P-CCNC: 76 UNIT/L (ref 10–44)
ANION GAP (OHS): 11 MMOL/L (ref 8–16)
AST SERPL-CCNC: 40 UNIT/L (ref 11–45)
BASOPHILS # BLD AUTO: 0.02 K/UL
BASOPHILS NFR BLD AUTO: 0.3 %
BILIRUB SERPL-MCNC: 0.3 MG/DL (ref 0.1–1)
BILIRUB UR QL STRIP.AUTO: NEGATIVE
BNP SERPL-MCNC: <10 PG/ML (ref 0–99)
BUN SERPL-MCNC: 9 MG/DL (ref 6–20)
CALCIUM SERPL-MCNC: 9.5 MG/DL (ref 8.7–10.5)
CHLORIDE SERPL-SCNC: 104 MMOL/L (ref 95–110)
CK SERPL-CCNC: 151 U/L (ref 20–200)
CLARITY UR: CLEAR
CO2 SERPL-SCNC: 25 MMOL/L (ref 23–29)
COLOR UR AUTO: YELLOW
CREAT SERPL-MCNC: 1 MG/DL (ref 0.5–1.4)
ERYTHROCYTE [DISTWIDTH] IN BLOOD BY AUTOMATED COUNT: 12.6 % (ref 11.5–14.5)
GFR SERPLBLD CREATININE-BSD FMLA CKD-EPI: >60 ML/MIN/1.73/M2
GLUCOSE SERPL-MCNC: 101 MG/DL (ref 70–110)
GLUCOSE UR QL STRIP: NEGATIVE
HCT VFR BLD AUTO: 42.9 % (ref 40–54)
HCV AB SERPL QL IA: NEGATIVE
HGB BLD-MCNC: 14.5 GM/DL (ref 14–18)
HGB UR QL STRIP: NEGATIVE
HIV 1+2 AB+HIV1 P24 AG SERPL QL IA: NEGATIVE
IMM GRANULOCYTES # BLD AUTO: 0.05 K/UL (ref 0–0.04)
IMM GRANULOCYTES NFR BLD AUTO: 0.9 % (ref 0–0.5)
KETONES UR QL STRIP: NEGATIVE
LEUKOCYTE ESTERASE UR QL STRIP: NEGATIVE
LYMPHOCYTES # BLD AUTO: 2.35 K/UL (ref 1–4.8)
MCH RBC QN AUTO: 28 PG (ref 27–50)
MCHC RBC AUTO-ENTMCNC: 33.8 G/DL (ref 32–36)
MCV RBC AUTO: 83 FL (ref 82–98)
NITRITE UR QL STRIP: NEGATIVE
NUCLEATED RBC (/100WBC) (OHS): 0 /100 WBC
OHS QRS DURATION: 86 MS
OHS QTC CALCULATION: 428 MS
PH UR STRIP: 6 [PH]
PLATELET # BLD AUTO: 196 K/UL (ref 150–450)
PMV BLD AUTO: 8.8 FL (ref 9.2–12.9)
POTASSIUM SERPL-SCNC: 4.3 MMOL/L (ref 3.5–5.1)
PROT SERPL-MCNC: 7.3 GM/DL (ref 6–8.4)
PROT UR QL STRIP: NEGATIVE
RBC # BLD AUTO: 5.18 M/UL (ref 4.6–6.2)
RELATIVE EOSINOPHIL (OHS): 1.4 %
RELATIVE LYMPHOCYTE (OHS): 40 % (ref 18–48)
RELATIVE MONOCYTE (OHS): 9.9 % (ref 4–15)
RELATIVE NEUTROPHIL (OHS): 47.5 % (ref 38–73)
SODIUM SERPL-SCNC: 140 MMOL/L (ref 136–145)
SP GR UR STRIP: 1.02
TROPONIN I SERPL DL<=0.01 NG/ML-MCNC: <0.006 NG/ML
UROBILINOGEN UR STRIP-ACNC: NEGATIVE EU/DL
WBC # BLD AUTO: 5.87 K/UL (ref 3.9–12.7)

## 2025-03-30 PROCEDURE — 93010 ELECTROCARDIOGRAM REPORT: CPT | Mod: ,,, | Performed by: INTERNAL MEDICINE

## 2025-03-30 PROCEDURE — 84295 ASSAY OF SERUM SODIUM: CPT | Performed by: EMERGENCY MEDICINE

## 2025-03-30 PROCEDURE — 84484 ASSAY OF TROPONIN QUANT: CPT | Performed by: EMERGENCY MEDICINE

## 2025-03-30 PROCEDURE — 82550 ASSAY OF CK (CPK): CPT | Performed by: EMERGENCY MEDICINE

## 2025-03-30 PROCEDURE — 85025 COMPLETE CBC W/AUTO DIFF WBC: CPT | Performed by: EMERGENCY MEDICINE

## 2025-03-30 PROCEDURE — 81003 URINALYSIS AUTO W/O SCOPE: CPT | Performed by: EMERGENCY MEDICINE

## 2025-03-30 PROCEDURE — 99285 EMERGENCY DEPT VISIT HI MDM: CPT | Mod: 25

## 2025-03-30 PROCEDURE — 93005 ELECTROCARDIOGRAM TRACING: CPT

## 2025-03-30 PROCEDURE — 86803 HEPATITIS C AB TEST: CPT | Performed by: EMERGENCY MEDICINE

## 2025-03-30 PROCEDURE — 83880 ASSAY OF NATRIURETIC PEPTIDE: CPT | Performed by: EMERGENCY MEDICINE

## 2025-03-30 PROCEDURE — 87389 HIV-1 AG W/HIV-1&-2 AB AG IA: CPT | Performed by: EMERGENCY MEDICINE

## 2025-03-30 RX ORDER — AMLODIPINE BESYLATE 5 MG/1
10 TABLET ORAL DAILY
Qty: 30 TABLET | Refills: 0 | Status: SHIPPED | OUTPATIENT
Start: 2025-03-30 | End: 2026-03-30

## 2025-03-30 RX ORDER — AMLODIPINE BESYLATE 5 MG/1
10 TABLET ORAL DAILY
Qty: 30 TABLET | Refills: 0 | Status: SHIPPED | OUTPATIENT
Start: 2025-03-30 | End: 2025-03-30

## 2025-03-30 NOTE — ED PROVIDER NOTES
SCRIBE #1 NOTE: I, Olga Ramos, am scribing for, and in the presence of, John Lee MD. I have scribed the entire note.       History     Chief Complaint   Patient presents with    Hypertension     Pt c/o of tingling to left leg that started last night while trying to go to sleep but has now resolved, and lightheadedness that started on Friday night. Denies headache. Pt also c/o of intermittent pain to left arm.  Pt has PMHx of high BP. Denies N/V/D.     Review of patient's allergies indicates:  No Known Allergies      History of Present Illness     HPI    3/30/2025, 7:06 AM  History obtained from the patient and medical records      History of Present Illness: Cirilo Pichardo is a 39 y.o. male patient with a PMHx of depression, HA, and schizo-affective disorder who presents to the Emergency Department for evaluation of HTN. Pt is also c/o a tingling sensation in his left leg that began last night and intermittent bilateral shoulder pain. No mitigating or exacerbating factors reported. Associated sxs include CP. Patient denies any headache, fever, or N/V/D. No prior Tx specified.  No further complaints or concerns at this time.       Arrival mode: Personal Transportation    PCP: Ashlie Aguilar FNP        Past Medical History:  Past Medical History:   Diagnosis Date    Depression     Headache     Schizo-affective disorder        Past Surgical History:  No past surgical history on file.      Family History:  No family history on file.    Social History:  Social History     Tobacco Use    Smoking status: Never    Smokeless tobacco: Never   Substance and Sexual Activity    Alcohol use: No    Drug use: No    Sexual activity: Yes     Partners: Female        Review of Systems     Review of Systems   Constitutional:  Negative for fever.        (+) HTN   HENT:  Negative for sore throat.    Respiratory:  Negative for shortness of breath.    Cardiovascular:  Positive for chest pain.   Gastrointestinal:   "Negative for diarrhea, nausea and vomiting.   Genitourinary:  Negative for dysuria.   Musculoskeletal:  Positive for arthralgias (bilateral shoulders). Negative for back pain.   Skin:  Negative for rash.   Neurological:  Positive for light-headedness. Negative for weakness and headaches.        (+) tingling LLE   Hematological:  Does not bruise/bleed easily.   All other systems reviewed and are negative.     Physical Exam     Initial Vitals [03/30/25 0645]   BP Pulse Resp Temp SpO2   (!) 148/85 103 20 98.1 °F (36.7 °C) 97 %      MAP       --          Physical Exam  Nursing Notes and Vital Signs Reviewed.  Constitutional: Patient is in no acute distress. Well-developed and well-nourished.  Head: Atraumatic. Normocephalic.  Eyes: PERRL. EOM intact. Conjunctivae are not pale. No scleral icterus.  ENT: Mucous membranes are moist. Oropharynx is clear and symmetric.    Neck: Supple. Full ROM. No lymphadenopathy.  Cardiovascular: Regular rate. Regular rhythm. No murmurs, rubs, or gallops. Distal pulses are 2+ and symmetric.  Pulmonary/Chest: No respiratory distress. Clear to auscultation bilaterally. No wheezing or rales.  Abdominal: Soft and non-distended.  There is no tenderness.  No rebound, guarding, or rigidity. Good bowel sounds.  Genitourinary: No CVA tenderness.  Musculoskeletal: Moves all extremities. No obvious deformities. No edema. No calf tenderness.  Skin: Warm and dry.  Neurological:  Alert, awake, and appropriate.  Normal speech.  No acute focal neurological deficits are appreciated.  Psychiatric: Normal affect. Good eye contact. Appropriate in content.     ED Course   Procedures  ED Vital Signs:  Vitals:    03/30/25 0645 03/30/25 0700   BP: (!) 148/85 (!) 136/90   Pulse: 103 93   Resp: 20 14   Temp: 98.1 °F (36.7 °C)    TempSrc: Oral    SpO2: 97% 96%   Weight: 112.2 kg (247 lb 5.7 oz)    Height: 5' 5" (1.651 m)        Abnormal Lab Results:  Labs Reviewed   COMPREHENSIVE METABOLIC PANEL - Abnormal       " Result Value    Sodium 140      Potassium 4.3      Chloride 104      CO2 25      Glucose 101      BUN 9      Creatinine 1.0      Calcium 9.5      Protein Total 7.3      Albumin 3.6      Bilirubin Total 0.3            AST 40      ALT 76 (*)     Anion Gap 11      eGFR >60     CBC WITH DIFFERENTIAL - Abnormal    WBC 5.87      RBC 5.18      HGB 14.5      HCT 42.9      MCV 83      MCH 28.0      MCHC 33.8      RDW 12.6      Platelet Count 196      MPV 8.8 (*)     Nucleated RBC 0      Neut % 47.5      Lymph % 40.0      Mono % 9.9      Eos % 1.4      Basophil % 0.3      Imm Grans % 0.9 (*)     Neut # 2.79      Lymph # 2.35      Mono # 0.58      Eos # 0.08      Baso # 0.02      Imm Grans # 0.05 (*)    HEPATITIS C ANTIBODY - Normal    Hep C Ab Interp Negative     HIV 1 / 2 ANTIBODY - Normal    HIV 1/2 Ag/Ab Negative     URINALYSIS, REFLEX TO URINE CULTURE - Normal    Color, UA Yellow      Appearance, UA Clear      pH, UA 6.0      Spec Grav UA 1.025      Protein, UA Negative      Glucose, UA Negative      Ketones, UA Negative      Bilirubin, UA Negative      Blood, UA Negative      Nitrites, UA Negative      Urobilinogen, UA Negative      Leukocyte Esterase, UA Negative     B-TYPE NATRIURETIC PEPTIDE - Normal    BNP <10     CK - Normal         TROPONIN I - Normal    Troponin-I <0.006     CBC W/ AUTO DIFFERENTIAL    Narrative:     The following orders were created for panel order CBC Auto Differential.  Procedure                               Abnormality         Status                     ---------                               -----------         ------                     CBC with Differential[[id number]]       Abnormal            Final result                 Please view results for these tests on the individual orders.   HEP C VIRUS HOLD SPECIMEN        All Lab Results:  Results for orders placed or performed during the hospital encounter of 03/30/25   Hepatitis C Antibody    Collection Time: 03/30/25  7:09 AM    Result Value Ref Range    Hep C Ab Interp Negative Negative   HIV 1/2 Ag/Ab (4th Gen)    Collection Time: 03/30/25  7:09 AM   Result Value Ref Range    HIV 1/2 Ag/Ab Negative Negative   Comprehensive Metabolic Panel    Collection Time: 03/30/25  7:09 AM   Result Value Ref Range    Sodium 140 136 - 145 mmol/L    Potassium 4.3 3.5 - 5.1 mmol/L    Chloride 104 95 - 110 mmol/L    CO2 25 23 - 29 mmol/L    Glucose 101 70 - 110 mg/dL    BUN 9 6 - 20 mg/dL    Creatinine 1.0 0.5 - 1.4 mg/dL    Calcium 9.5 8.7 - 10.5 mg/dL    Protein Total 7.3 6.0 - 8.4 gm/dL    Albumin 3.6 3.5 - 5.2 g/dL    Bilirubin Total 0.3 0.1 - 1.0 mg/dL     40 - 150 unit/L    AST 40 11 - 45 unit/L    ALT 76 (H) 10 - 44 unit/L    Anion Gap 11 8 - 16 mmol/L    eGFR >60 >60 mL/min/1.73/m2   BNP    Collection Time: 03/30/25  7:09 AM   Result Value Ref Range    BNP <10 0 - 99 pg/mL   CK    Collection Time: 03/30/25  7:09 AM   Result Value Ref Range     20 - 200 U/L   Troponin I    Collection Time: 03/30/25  7:09 AM   Result Value Ref Range    Troponin-I <0.006 <=0.026 ng/mL   CBC with Differential    Collection Time: 03/30/25  7:09 AM   Result Value Ref Range    WBC 5.87 3.90 - 12.70 K/uL    RBC 5.18 4.60 - 6.20 M/uL    HGB 14.5 14.0 - 18.0 gm/dL    HCT 42.9 40.0 - 54.0 %    MCV 83 82 - 98 fL    MCH 28.0 27.0 - 50.0 pg    MCHC 33.8 32.0 - 36.0 g/dL    RDW 12.6 11.5 - 14.5 %    Platelet Count 196 150 - 450 K/uL    MPV 8.8 (L) 9.2 - 12.9 fL    Nucleated RBC 0 <=0 /100 WBC    Neut % 47.5 38 - 73 %    Lymph % 40.0 18 - 48 %    Mono % 9.9 4 - 15 %    Eos % 1.4 <=8 %    Basophil % 0.3 <=1.9 %    Imm Grans % 0.9 (H) 0.0 - 0.5 %    Neut # 2.79 1.8 - 7.7 K/uL    Lymph # 2.35 1 - 4.8 K/uL    Mono # 0.58 0.3 - 1 K/uL    Eos # 0.08 <=0.5 K/uL    Baso # 0.02 <=0.2 K/uL    Imm Grans # 0.05 (H) 0.00 - 0.04 K/uL   Urinalysis, Reflex to Urine Culture    Collection Time: 03/30/25  7:25 AM    Specimen: Urine, Clean Catch   Result Value Ref Range    Color, UA  Yellow Straw, Kalee, Yellow, Light-Orange    Appearance, UA Clear Clear    pH, UA 6.0 5.0 - 8.0    Spec Grav UA 1.025 1.005 - 1.030    Protein, UA Negative Negative    Glucose, UA Negative Negative    Ketones, UA Negative Negative    Bilirubin, UA Negative Negative    Blood, UA Negative Negative    Nitrites, UA Negative Negative    Urobilinogen, UA Negative <2.0 EU/dL    Leukocyte Esterase, UA Negative Negative       Imaging Results:  Imaging Results              X-Ray Chest AP Portable (Final result)  Result time 03/30/25 07:49:58      Final result by Aleksandr Gaffney MD (03/30/25 07:49:58)                   Impression:      1.  Negative for acute process involving the chest.    2.  Incidental findings as noted above.      Electronically signed by: Aleksandr Gaffney MD  Date:    03/30/2025  Time:    07:49               Narrative:    EXAMINATION:  XR CHEST AP PORTABLE    CLINICAL HISTORY:  chest pain;    COMPARISON:  No comparison studies are available.    FINDINGS:  EKG leads overlie the chest.  The lungs are clear. The cardiac silhouette size is normal. The trachea is midline and the mediastinal width is normal. Negative for focal infiltrate, effusion or pneumothorax. Pulmonary vasculature is normal. Negative for osseous abnormalities. Tortuous aorta.  Marginal spondylosis.                                       The EKG was ordered, reviewed, and independently interpreted by the ED provider.  Interpretation time: 6:57  Rate: 100 BPM  Rhythm: normal sinus rhythm  Interpretation: Minimal voltage criteria for LVH, may be normal variant (R in aVL). Inferior infarct, age undetermined. No STEMI.         The Emergency Provider reviewed the vital signs and test results, which are outlined above.     ED Discussion     7:58 AM: Reassessed pt at this time. Discussed with patient and/or family/caretaker all pertinent ED information and results. Discussed pt dx and plan of tx. Gave the patient all f/u and return to the ED  instructions. All questions and concerns were addressed at this time. Patient and/or family/caretaker expresses understanding of information and instructions, and is comfortable with plan to discharge. Pt is stable for discharge.     I discussed with patient and/or family/caretaker that evaluation in the ED does not suggest any emergent or life threatening medical conditions requiring immediate intervention beyond what was provided in the ED, and I believe patient is safe for discharge.  Regardless, an unremarkable evaluation in the ED does not preclude the development or presence of a serious of life threatening condition. As such, I instructed that the patient is to return immediately for any worsening or change in current symptoms.       Medical Decision Making  DDx: htn, chest pain    Amount and/or Complexity of Data Reviewed  Labs: ordered. Decision-making details documented in ED Course.     Details: Negative for ischemia  Radiology: ordered. Decision-making details documented in ED Course.  ECG/medicine tests: ordered and independent interpretation performed. Decision-making details documented in ED Course.    Risk  Prescription drug management.                ED Medication(s):  Medications - No data to display    Current Discharge Medication List        START taking these medications    Details   amLODIPine (NORVASC) 5 MG tablet Take 2 tablets (10 mg total) by mouth once daily.  Qty: 30 tablet, Refills: 0    Comments: .              Follow-up Information       Ashlie Aguilar FNP.    Specialty: Family Medicine  Contact information:  [address] [address]  [address] [place] [address] [phone]                                 Scribe Attestation:   Scribe #1: I performed the above scribed service and the documentation accurately describes the services I performed. I attest to the accuracy of the note.     Attending:   Physician Attestation Statement for Scribe #1: I, John Lee MD, personally  performed the services described in this documentation, as scribed by Olga Ramos, in my presence, and it is both accurate and complete.           Clinical Impression       ICD-10-CM ICD-9-CM   1. Hypertension, unspecified type  I10 401.9   2. Chest pain  R07.9 786.50       Disposition:   Disposition: Discharged  Condition: Stable         John Lee MD  03/30/25 0808

## 2025-05-08 ENCOUNTER — HOSPITAL ENCOUNTER (EMERGENCY)
Facility: HOSPITAL | Age: 40
Discharge: HOME | End: 2025-05-08
Attending: EMERGENCY MEDICINE
Payer: MEDICAID

## 2025-05-08 VITALS
DIASTOLIC BLOOD PRESSURE: 81 MMHG | TEMPERATURE: 98 F | WEIGHT: 248.69 LBS | RESPIRATION RATE: 20 BRPM | HEART RATE: 101 BPM | OXYGEN SATURATION: 99 % | HEIGHT: 65 IN | BODY MASS INDEX: 41.43 KG/M2 | SYSTOLIC BLOOD PRESSURE: 166 MMHG

## 2025-05-08 DIAGNOSIS — I10 HYPERTENSION, UNSPECIFIED TYPE: Primary | ICD-10-CM

## 2025-05-08 DIAGNOSIS — R55 NEAR SYNCOPE: ICD-10-CM

## 2025-05-08 DIAGNOSIS — R00.0 TACHYCARDIA: ICD-10-CM

## 2025-05-08 LAB
ABSOLUTE EOSINOPHIL (OHS): 0.07 K/UL
ABSOLUTE MONOCYTE (OHS): 0.44 K/UL (ref 0.3–1)
ABSOLUTE NEUTROPHIL COUNT (OHS): 3.74 K/UL (ref 1.8–7.7)
ALBUMIN SERPL BCP-MCNC: 4 G/DL (ref 3.5–5.2)
ALP SERPL-CCNC: 115 UNIT/L (ref 40–150)
ALT SERPL W/O P-5'-P-CCNC: 76 UNIT/L (ref 10–44)
ANION GAP (OHS): 10 MMOL/L (ref 8–16)
AST SERPL-CCNC: 42 UNIT/L (ref 11–45)
BASOPHILS # BLD AUTO: 0.03 K/UL
BASOPHILS NFR BLD AUTO: 0.5 %
BILIRUB SERPL-MCNC: 0.4 MG/DL (ref 0.1–1)
BNP SERPL-MCNC: <10 PG/ML (ref 0–99)
BUN SERPL-MCNC: 10 MG/DL (ref 6–20)
CALCIUM SERPL-MCNC: 9.5 MG/DL (ref 8.7–10.5)
CHLORIDE SERPL-SCNC: 101 MMOL/L (ref 95–110)
CO2 SERPL-SCNC: 27 MMOL/L (ref 23–29)
CREAT SERPL-MCNC: 0.9 MG/DL (ref 0.5–1.4)
ERYTHROCYTE [DISTWIDTH] IN BLOOD BY AUTOMATED COUNT: 12.6 % (ref 11.5–14.5)
GFR SERPLBLD CREATININE-BSD FMLA CKD-EPI: >60 ML/MIN/1.73/M2
GLUCOSE SERPL-MCNC: 90 MG/DL (ref 70–110)
HCT VFR BLD AUTO: 44 % (ref 40–54)
HGB BLD-MCNC: 14.7 GM/DL (ref 14–18)
IMM GRANULOCYTES # BLD AUTO: 0.03 K/UL (ref 0–0.04)
IMM GRANULOCYTES NFR BLD AUTO: 0.5 % (ref 0–0.5)
LYMPHOCYTES # BLD AUTO: 2.32 K/UL (ref 1–4.8)
MCH RBC QN AUTO: 28.1 PG (ref 27–31)
MCHC RBC AUTO-ENTMCNC: 33.4 G/DL (ref 32–36)
MCV RBC AUTO: 84 FL (ref 82–98)
NUCLEATED RBC (/100WBC) (OHS): 0 /100 WBC
PLATELET # BLD AUTO: 212 K/UL (ref 150–450)
PMV BLD AUTO: 8.7 FL (ref 9.2–12.9)
POTASSIUM SERPL-SCNC: 4.4 MMOL/L (ref 3.5–5.1)
PROT SERPL-MCNC: 8.1 GM/DL (ref 6–8.4)
RBC # BLD AUTO: 5.23 M/UL (ref 4.6–6.2)
RELATIVE EOSINOPHIL (OHS): 1.1 %
RELATIVE LYMPHOCYTE (OHS): 35 % (ref 18–48)
RELATIVE MONOCYTE (OHS): 6.6 % (ref 4–15)
RELATIVE NEUTROPHIL (OHS): 56.3 % (ref 38–73)
SODIUM SERPL-SCNC: 138 MMOL/L (ref 136–145)
TROPONIN I SERPL DL<=0.01 NG/ML-MCNC: <0.006 NG/ML
WBC # BLD AUTO: 6.63 K/UL (ref 3.9–12.7)

## 2025-05-08 PROCEDURE — 85025 COMPLETE CBC W/AUTO DIFF WBC: CPT | Performed by: EMERGENCY MEDICINE

## 2025-05-08 PROCEDURE — 93005 ELECTROCARDIOGRAM TRACING: CPT

## 2025-05-08 PROCEDURE — 99285 EMERGENCY DEPT VISIT HI MDM: CPT | Mod: 25

## 2025-05-08 PROCEDURE — 93010 ELECTROCARDIOGRAM REPORT: CPT | Mod: ,,, | Performed by: INTERNAL MEDICINE

## 2025-05-08 PROCEDURE — 80053 COMPREHEN METABOLIC PANEL: CPT | Performed by: EMERGENCY MEDICINE

## 2025-05-08 PROCEDURE — 84484 ASSAY OF TROPONIN QUANT: CPT | Performed by: EMERGENCY MEDICINE

## 2025-05-08 PROCEDURE — 83880 ASSAY OF NATRIURETIC PEPTIDE: CPT | Performed by: EMERGENCY MEDICINE

## 2025-05-08 NOTE — ED TRIAGE NOTES
Cirilo Pichardo, a 39 y.o. male presents to the ED w/ complaint of hypertension for 3 days.     Triage note:  Chief Complaint   Patient presents with    Hypertension     HTN last 2-3 days, sent by psychiatrist for high liver enzymes and HTN. Pt reports fatigue.     Review of patient's allergies indicates:  No Known Allergies  Past Medical History:   Diagnosis Date    Depression     Headache     Schizo-affective disorder

## 2025-05-11 LAB
OHS QRS DURATION: 90 MS
OHS QTC CALCULATION: 402 MS

## 2025-05-24 PROCEDURE — 99283 EMERGENCY DEPT VISIT LOW MDM: CPT

## 2025-05-25 ENCOUNTER — HOSPITAL ENCOUNTER (EMERGENCY)
Facility: HOSPITAL | Age: 40
Discharge: LEFT BEFORE DISCHARGE | End: 2025-05-25
Payer: MEDICAID

## 2025-05-25 VITALS
OXYGEN SATURATION: 93 % | HEIGHT: 65 IN | RESPIRATION RATE: 15 BRPM | TEMPERATURE: 98 F | BODY MASS INDEX: 41.1 KG/M2 | WEIGHT: 246.69 LBS | DIASTOLIC BLOOD PRESSURE: 73 MMHG | SYSTOLIC BLOOD PRESSURE: 120 MMHG | HEART RATE: 93 BPM

## 2025-05-25 DIAGNOSIS — R05.9 COUGH: ICD-10-CM

## 2025-05-25 DIAGNOSIS — R06.02 SOB (SHORTNESS OF BREATH): ICD-10-CM

## 2025-07-02 ENCOUNTER — HOSPITAL ENCOUNTER (EMERGENCY)
Facility: HOSPITAL | Age: 40
Discharge: HOME | End: 2025-07-02
Payer: MEDICAID

## 2025-07-02 VITALS
BODY MASS INDEX: 40.04 KG/M2 | SYSTOLIC BLOOD PRESSURE: 147 MMHG | OXYGEN SATURATION: 99 % | RESPIRATION RATE: 18 BRPM | WEIGHT: 240.31 LBS | HEART RATE: 110 BPM | HEIGHT: 65 IN | TEMPERATURE: 99 F | DIASTOLIC BLOOD PRESSURE: 83 MMHG

## 2025-07-02 DIAGNOSIS — K57.92 DIVERTICULITIS: Primary | ICD-10-CM

## 2025-07-02 DIAGNOSIS — Z13.6 SCREENING FOR CARDIOVASCULAR CONDITION: ICD-10-CM

## 2025-07-02 LAB
ABSOLUTE EOSINOPHIL (OHS): 0.02 K/UL
ABSOLUTE MONOCYTE (OHS): 1.36 K/UL (ref 0.3–1)
ABSOLUTE NEUTROPHIL COUNT (OHS): 9.8 K/UL (ref 1.8–7.7)
ALBUMIN SERPL BCP-MCNC: 4.2 G/DL (ref 3.5–5.2)
ALP SERPL-CCNC: 132 UNIT/L (ref 40–150)
ALT SERPL W/O P-5'-P-CCNC: 73 UNIT/L (ref 10–44)
ANION GAP (OHS): 12 MMOL/L (ref 8–16)
AST SERPL-CCNC: 26 UNIT/L (ref 11–45)
BASOPHILS # BLD AUTO: 0.03 K/UL
BASOPHILS NFR BLD AUTO: 0.2 %
BILIRUB SERPL-MCNC: 0.5 MG/DL (ref 0.1–1)
BILIRUB UR QL STRIP.AUTO: NEGATIVE
BUN SERPL-MCNC: 9 MG/DL (ref 6–20)
CALCIUM SERPL-MCNC: 10.1 MG/DL (ref 8.7–10.5)
CHLORIDE SERPL-SCNC: 100 MMOL/L (ref 95–110)
CLARITY UR: CLEAR
CO2 SERPL-SCNC: 23 MMOL/L (ref 23–29)
COLOR UR AUTO: YELLOW
CREAT SERPL-MCNC: 1.1 MG/DL (ref 0.5–1.4)
ERYTHROCYTE [DISTWIDTH] IN BLOOD BY AUTOMATED COUNT: 12.3 % (ref 11.5–14.5)
GFR SERPLBLD CREATININE-BSD FMLA CKD-EPI: >60 ML/MIN/1.73/M2
GLUCOSE SERPL-MCNC: 107 MG/DL (ref 70–110)
GLUCOSE UR QL STRIP: NEGATIVE
HCT VFR BLD AUTO: 45.4 % (ref 40–54)
HGB BLD-MCNC: 15.4 GM/DL (ref 14–18)
HGB UR QL STRIP: NEGATIVE
IMM GRANULOCYTES # BLD AUTO: 0.05 K/UL (ref 0–0.04)
IMM GRANULOCYTES NFR BLD AUTO: 0.4 % (ref 0–0.5)
INFLUENZA A MOLECULAR (OHS): NEGATIVE
INFLUENZA B MOLECULAR (OHS): NEGATIVE
KETONES UR QL STRIP: NEGATIVE
LACTATE SERPL-SCNC: 1.2 MMOL/L (ref 0.5–2.2)
LEUKOCYTE ESTERASE UR QL STRIP: NEGATIVE
LIPASE SERPL-CCNC: 18 U/L (ref 4–60)
LYMPHOCYTES # BLD AUTO: 2.02 K/UL (ref 1–4.8)
MCH RBC QN AUTO: 27.8 PG (ref 27–31)
MCHC RBC AUTO-ENTMCNC: 33.9 G/DL (ref 32–36)
MCV RBC AUTO: 82 FL (ref 82–98)
NITRITE UR QL STRIP: NEGATIVE
NUCLEATED RBC (/100WBC) (OHS): 0 /100 WBC
PH UR STRIP: 6 [PH]
PLATELET # BLD AUTO: 259 K/UL (ref 150–450)
PMV BLD AUTO: 9.3 FL (ref 9.2–12.9)
POTASSIUM SERPL-SCNC: 4.2 MMOL/L (ref 3.5–5.1)
PROCALCITONIN SERPL-MCNC: 0.05 NG/ML
PROT SERPL-MCNC: 8.8 GM/DL (ref 6–8.4)
PROT UR QL STRIP: ABNORMAL
RBC # BLD AUTO: 5.53 M/UL (ref 4.6–6.2)
RELATIVE EOSINOPHIL (OHS): 0.2 %
RELATIVE LYMPHOCYTE (OHS): 15.2 % (ref 18–48)
RELATIVE MONOCYTE (OHS): 10.2 % (ref 4–15)
RELATIVE NEUTROPHIL (OHS): 73.8 % (ref 38–73)
SODIUM SERPL-SCNC: 135 MMOL/L (ref 136–145)
SP GR UR STRIP: >=1.03
UROBILINOGEN UR STRIP-ACNC: NEGATIVE EU/DL
WBC # BLD AUTO: 13.28 K/UL (ref 3.9–12.7)

## 2025-07-02 PROCEDURE — 81003 URINALYSIS AUTO W/O SCOPE: CPT | Performed by: NURSE PRACTITIONER

## 2025-07-02 PROCEDURE — 25000003 PHARM REV CODE 250: Performed by: NURSE PRACTITIONER

## 2025-07-02 PROCEDURE — 93005 ELECTROCARDIOGRAM TRACING: CPT

## 2025-07-02 PROCEDURE — 83605 ASSAY OF LACTIC ACID: CPT | Performed by: NURSE PRACTITIONER

## 2025-07-02 PROCEDURE — 84145 PROCALCITONIN (PCT): CPT | Performed by: NURSE PRACTITIONER

## 2025-07-02 PROCEDURE — 96360 HYDRATION IV INFUSION INIT: CPT

## 2025-07-02 PROCEDURE — 85025 COMPLETE CBC W/AUTO DIFF WBC: CPT | Performed by: NURSE PRACTITIONER

## 2025-07-02 PROCEDURE — 25500020 PHARM REV CODE 255: Performed by: NURSE PRACTITIONER

## 2025-07-02 PROCEDURE — 93010 ELECTROCARDIOGRAM REPORT: CPT | Mod: ,,, | Performed by: INTERNAL MEDICINE

## 2025-07-02 PROCEDURE — 80053 COMPREHEN METABOLIC PANEL: CPT | Performed by: NURSE PRACTITIONER

## 2025-07-02 PROCEDURE — 83690 ASSAY OF LIPASE: CPT | Performed by: NURSE PRACTITIONER

## 2025-07-02 PROCEDURE — 87502 INFLUENZA DNA AMP PROBE: CPT | Performed by: NURSE PRACTITIONER

## 2025-07-02 PROCEDURE — 87040 BLOOD CULTURE FOR BACTERIA: CPT | Performed by: NURSE PRACTITIONER

## 2025-07-02 PROCEDURE — 99285 EMERGENCY DEPT VISIT HI MDM: CPT | Mod: 25

## 2025-07-02 RX ORDER — HYDROCODONE BITARTRATE AND ACETAMINOPHEN 7.5; 325 MG/1; MG/1
1 TABLET ORAL EVERY 6 HOURS PRN
Qty: 11 TABLET | Refills: 0 | Status: SHIPPED | OUTPATIENT
Start: 2025-07-02 | End: 2025-07-12

## 2025-07-02 RX ORDER — METRONIDAZOLE 500 MG/1
500 TABLET ORAL 3 TIMES DAILY
Qty: 15 TABLET | Refills: 0 | Status: SHIPPED | OUTPATIENT
Start: 2025-07-02 | End: 2025-07-07

## 2025-07-02 RX ORDER — DOCUSATE SODIUM 100 MG/1
100 CAPSULE, LIQUID FILLED ORAL 2 TIMES DAILY
Qty: 60 CAPSULE | Refills: 0 | Status: SHIPPED | OUTPATIENT
Start: 2025-07-02 | End: 2025-08-01

## 2025-07-02 RX ORDER — CIPROFLOXACIN 500 MG/1
500 TABLET, FILM COATED ORAL 2 TIMES DAILY
Qty: 10 TABLET | Refills: 0 | Status: SHIPPED | OUTPATIENT
Start: 2025-07-02 | End: 2025-07-07

## 2025-07-02 RX ADMIN — SODIUM CHLORIDE 1000 ML: 9 INJECTION, SOLUTION INTRAVENOUS at 04:07

## 2025-07-02 RX ADMIN — IOHEXOL 100 ML: 350 INJECTION, SOLUTION INTRAVENOUS at 04:07

## 2025-07-02 NOTE — Clinical Note
"Cirilo Oliveraon"Kylee was seen and treated in our emergency department on 7/2/2025.  He may return to work on 07/05/2025.       If you have any questions or concerns, please don't hesitate to call.      Sebastián Cheng NP"

## 2025-07-02 NOTE — FIRST PROVIDER EVALUATION
"Medical screening examination initiated.  I have conducted a focused provider triage encounter, findings are as follows:    Brief history of present illness:  Pt. C/o abd pain and constipation for three days.     Vitals:    07/02/25 1502   BP: 131/84   BP Location: Right arm   Pulse: (!) 131   Resp: 18   Temp: (!) 101 °F (38.3 °C)   TempSrc: Oral   SpO2: 95%   Weight: 109 kg (240 lb 4.8 oz)   Height: 5' 5" (1.651 m)       Pertinent physical exam:  distended abd, nad    Brief workup plan:  labs imaging    Preliminary workup initiated; this workup will be continued and followed by the physician or advanced practice provider that is assigned to the patient when roomed.  "

## 2025-07-03 LAB
HOLD SPECIMEN: NORMAL
OHS QRS DURATION: 82 MS
OHS QTC CALCULATION: 440 MS

## 2025-07-03 NOTE — ED PROVIDER NOTES
Encounter Date: 7/2/2025       History     Chief Complaint   Patient presents with    Constipation     Pt states he's been having constipation for the past 3 days. Pt states OTC are not helping. Pt states when he does have a BM it is most liquid. Pt states he is having abd pain as well.      Patient complains of generalized abdominal pain states for the past few days he has been constipated is having bowel movements but much less.  Patient did not know that he was febrile does not complain of any racing heart sensation or chest pain or shortness of breath.        Review of patient's allergies indicates:  No Known Allergies  Past Medical History:   Diagnosis Date    Depression     Headache     Schizo-affective disorder      History reviewed. No pertinent surgical history.  No family history on file.  Social History[1]  Review of Systems   Constitutional:  Negative for fever.   HENT:  Negative for sore throat.    Respiratory:  Negative for shortness of breath.    Cardiovascular:  Negative for chest pain.   Gastrointestinal:  Negative for nausea.   Genitourinary:  Negative for dysuria.   Musculoskeletal:  Negative for back pain.   Skin:  Negative for rash.   Neurological:  Negative for weakness.   Hematological:  Does not bruise/bleed easily.       Physical Exam     Initial Vitals [07/02/25 1502]   BP Pulse Resp Temp SpO2   131/84 (!) 131 18 (!) 101 °F (38.3 °C) 95 %      MAP       --         Physical Exam    Nursing note and vitals reviewed.  Constitutional: He appears well-developed and well-nourished. He is not diaphoretic. No distress.   HENT:   Head: Normocephalic and atraumatic.   Eyes: Conjunctivae are normal. Pupils are equal, round, and reactive to light.   Neck: Neck supple.   Normal range of motion.  Cardiovascular:  Normal rate, regular rhythm, normal heart sounds and intact distal pulses.           Pulmonary/Chest: Breath sounds normal. No respiratory distress.   Abdominal: Abdomen is soft. He exhibits  distension. There is abdominal tenderness (Generalized abdominal tenderness no focal tenderness.). There is no rebound and no guarding.   Musculoskeletal:         General: Normal range of motion.      Cervical back: Normal range of motion and neck supple.     Neurological: He is alert.   Skin: Skin is warm and dry.   Psychiatric: He has a normal mood and affect. His behavior is normal. Thought content normal.         ED Course   Procedures  Labs Reviewed   COMPREHENSIVE METABOLIC PANEL - Abnormal       Result Value    Sodium 135 (*)     Potassium 4.2      Chloride 100      CO2 23      Glucose 107      BUN 9      Creatinine 1.1      Calcium 10.1      Protein Total 8.8 (*)     Albumin 4.2      Bilirubin Total 0.5            AST 26      ALT 73 (*)     Anion Gap 12      eGFR >60     URINALYSIS, REFLEX TO URINE CULTURE - Abnormal    Color, UA Yellow      Appearance, UA Clear      pH, UA 6.0      Spec Grav UA >=1.030 (*)     Protein, UA Trace (*)     Glucose, UA Negative      Ketones, UA Negative      Bilirubin, UA Negative      Blood, UA Negative      Nitrites, UA Negative      Urobilinogen, UA Negative      Leukocyte Esterase, UA Negative     CBC WITH DIFFERENTIAL - Abnormal    WBC 13.28 (*)     RBC 5.53      HGB 15.4      HCT 45.4      MCV 82      MCH 27.8      MCHC 33.9      RDW 12.3      Platelet Count 259      MPV 9.3      Nucleated RBC 0      Neut % 73.8 (*)     Lymph % 15.2 (*)     Mono % 10.2      Eos % 0.2      Basophil % 0.2      Imm Grans % 0.4      Neut # 9.80 (*)     Lymph # 2.02      Mono # 1.36 (*)     Eos # 0.02      Baso # 0.03      Imm Grans # 0.05 (*)    CULTURE, BLOOD - Normal    Blood Culture No Growth After 6 Hours     CULTURE, BLOOD - Normal    Blood Culture No Growth After 6 Hours     INFLUENZA A & B BY MOLECULAR - Normal    INFLUENZA A MOLECULAR Negative      INFLUENZA B MOLECULAR  Negative     LACTIC ACID, PLASMA - Normal    Lactic Acid Level 1.2      Narrative:     Falsely low lactic  acid results can be found in samples containing >=13.0 mg/dL total bilirubin and/or >=3.5 mg/dL direct bilirubin.    PROCALCITONIN - Normal    Procalcitonin 0.05     LIPASE - Normal    Lipase Level 18     CBC W/ AUTO DIFFERENTIAL    Narrative:     The following orders were created for panel order CBC auto differential.  Procedure                               Abnormality         Status                     ---------                               -----------         ------                     CBC with Differential[[id number]]       Abnormal            Final result                 Please view results for these tests on the individual orders.   [facility] [place] URINE HOLD        ECG Results              EKG 12-lead (In process)        Collection Time Result Time QRS Duration [facility] QTC Calculation    07/02/25 15:30:19 07/02/25 15:45:27 82 440                     In process by Interface, Lab In [facility] (07/02/25 15:45:33)                   Narrative:    Test Reason : Z13.6,    Vent. Rate : 128 BPM     Atrial Rate : 128 BPM     P-R Int : 144 ms          QRS Dur :  82 ms      QT Int : 302 ms       P-R-T Axes :  30  42 -34 degrees    QTcB Int : 440 ms    Sinus tachycardia  Moderate voltage criteria for LVH, may be normal variant  Possible Inferior infarct ,age undetermined  Possible Anterior infarct ,age undetermined  T wave abnormality, consider lateral ischemia  Abnormal ECG  No previous ECGs available    Referred By: AAAREFERRAL SELF           Confirmed By:                                   Imaging Results              CT Abdomen Pelvis With IV Contrast NO Oral Contrast (Final result)  Result time 07/02/25 16:46:26      Final result by Mary Hammer MD (07/02/25 16:46:26)                   Impression:       Descending colon diverticulitis uncomplicated    All CT scans at [this location] are performed using dose modulation techniques as appropriate to a performed exam including the following: automated exposure control;  adjustment of the mA and/or kV according to patient size (this includes techniques or standardized protocols for targeted exams where dose is matched to indication / reason for exam; i.e. extremities or head); use of iterative reconstruction technique.    Finalized on: 7/2/2025 4:46 PM By:  Mary Hammer MD  [facility]# [id number]      2025-07-02 16:48:29.226     [facility]               Narrative:    EXAM: CT ABDOMEN PELVIS WITH IV CONTRAST    CLINICAL INDICATION:  Abdominal pain acute    TECHNIQUE: Axial and multiplanar 2-D reformations provided  CT abdomen and pelvis with intravenous contrast    FINDINGS:  There is diverticulitis descending colon without perforation or abscess seen.  No signs of appendicitis and no obstructive bowel findings.    Liver appears fatty and gallstones present.  Pancreas and spleen unremarkable  Kidneys without hydronephrosis    Urinary bladder unremarkable                                         X-Ray Chest AP Portable (Final result)  Result time 07/02/25 15:53:02      Final result by Cj Hammond MD (07/02/25 15:53:02)                   Impression:     No acute findings.    Finalized on: 7/2/2025 3:53 PM By:  Cj Hammond MD  [facility]# [id number]      2025-07-02 15:55:06.136     [facility]               Narrative:    EXAM: XR CHEST AP PORTABLE    CLINICAL HISTORY: Sepsis    COMPARISON: Comparison 05/08/2025.    FINDINGS:  The heart is normal.  Lungs are clear.                                         Medications   iohexoL (OMNIPAQUE 350) injection 100 mL (100 mLs Intravenous Given 7/2/25 1624)   sodium chloride 0.9% bolus 1,000 mL 1,000 mL (0 mLs Intravenous Stopped 7/2/25 1819)     Medical Decision Making  Differential diagnosis considered but not limited to; viral gastroenteritis, constipation, small-bowel obstruction, diverticulitis.  CT showed evidence of diverticulitis without perforation.  Patient's heart rate was elevated did come down with IV fluids.  Still a little bit elevated at discharge  however the patient states he is in no distress feels fine.  His pain is minor at this time.  Decided that due to abnormal vital signs we will start patient on Flagyl Cipro for this diverticulitis and have him follow up with Gastroenterology.    Amount and/or Complexity of Data Reviewed  Labs: ordered.  Radiology: ordered.    Risk  OTC drugs.  Prescription drug management.                                      Clinical Impression:  Final diagnoses:  [Z13.6] Screening for cardiovascular condition  [K57.92] Diverticulitis (Primary)          ED Disposition Condition    Discharge           ED Prescriptions       Medication Sig Dispense Start Date End Date Auth. Provider    ciprofloxacin HCl (CIPRO) 500 MG tablet Take 1 tablet (500 mg total) by mouth 2 (two) times daily. for 5 days 10 tablet 7/2/2025 7/7/2025 Sebastián Cheng NP    metroNIDAZOLE (FLAGYL) 500 MG tablet Take 1 tablet (500 mg total) by mouth 3 (three) times daily. for 5 days 15 tablet 7/2/2025 7/7/2025 Sebastián Chegn NP    HYDROcodone-acetaminophen (NORCO) 7.5-325 mg per tablet Take 1 tablet by mouth every 6 (six) hours as needed. 11 tablet 7/2/2025 7/12/2025 Sebastián Cheng NP    docusate sodium (COLACE) 100 MG capsule Take 1 capsule (100 mg total) by mouth 2 (two) times daily. 60 capsule 7/2/2025 8/1/2025 Sebastián Cheng NP          Follow-up Information       Follow up With Specialties Details Why Contact Info    [facility] Gastroenterology   [id number] [place] [address] [phone]                   [1]   Social History  Tobacco Use    Smoking status: Never    Smokeless tobacco: Never   Vaping Use    Vaping status: Never Used   Substance Use Topics    Alcohol use: No    Drug use: No        Sebastián Cheng NP  07/03/25 9260

## 2025-07-08 LAB
BACTERIA BLD CULT: NORMAL
BACTERIA BLD CULT: NORMAL